# Patient Record
Sex: MALE | Race: WHITE | Employment: OTHER | URBAN - METROPOLITAN AREA
[De-identification: names, ages, dates, MRNs, and addresses within clinical notes are randomized per-mention and may not be internally consistent; named-entity substitution may affect disease eponyms.]

---

## 2017-02-04 ENCOUNTER — HOSPITAL ENCOUNTER (EMERGENCY)
Age: 22
Discharge: HOME | End: 2017-02-04
Attending: FAMILY MEDICINE | Admitting: FAMILY MEDICINE
Payer: MEDICAID

## 2017-02-04 ENCOUNTER — APPOINTMENT (OUTPATIENT)
Dept: GENERAL RADIOLOGY | Age: 22
End: 2017-02-04
Attending: PHYSICIAN ASSISTANT
Payer: MEDICAID

## 2017-02-04 ENCOUNTER — APPOINTMENT (OUTPATIENT)
Dept: CT IMAGING | Age: 22
End: 2017-02-04
Attending: PHYSICIAN ASSISTANT
Payer: MEDICAID

## 2017-02-04 VITALS
OXYGEN SATURATION: 100 % | HEIGHT: 71 IN | HEART RATE: 78 BPM | BODY MASS INDEX: 19.04 KG/M2 | TEMPERATURE: 97.4 F | WEIGHT: 136 LBS | SYSTOLIC BLOOD PRESSURE: 134 MMHG | DIASTOLIC BLOOD PRESSURE: 75 MMHG | RESPIRATION RATE: 16 BRPM

## 2017-02-04 DIAGNOSIS — S20.212A RIB CONTUSION, LEFT, INITIAL ENCOUNTER: ICD-10-CM

## 2017-02-04 DIAGNOSIS — T07.XXXA ABRASIONS OF MULTIPLE SITES: ICD-10-CM
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
## 2017-02-04 DIAGNOSIS — S50.11XA CONTUSION, ELBOW, WITH FOREARM, RIGHT, INITIAL ENCOUNTER: ICD-10-CM

## 2017-02-04 DIAGNOSIS — S02.402A CLOSED FRACTURE OF ZYGOMATIC ARCH, INITIAL ENCOUNTER (HCC): ICD-10-CM

## 2017-02-04 PROCEDURE — 70486 CT MAXILLOFACIAL W/O DYE: CPT

## 2017-02-04 PROCEDURE — 71101 X-RAY EXAM UNILAT RIBS/CHEST: CPT

## 2017-02-04 PROCEDURE — 73080 X-RAY EXAM OF ELBOW: CPT

## 2017-02-04 PROCEDURE — 99284 EMERGENCY DEPT VISIT MOD MDM: CPT

## 2017-02-04 PROCEDURE — 70450 CT HEAD/BRAIN W/O DYE: CPT

## 2017-02-04 RX ORDER — HYDROCODONE BITARTRATE AND ACETAMINOPHEN 5; 325 MG/1; MG/1
1 TABLET ORAL
Qty: 20 TAB | Refills: 0 | Status: SHIPPED | OUTPATIENT
Start: 2017-02-04 | End: 2018-09-26

## 2017-02-04 RX ORDER — DEXTROAMPHETAMINE SACCHARATE, AMPHETAMINE ASPARTATE, DEXTROAMPHETAMINE SULFATE AND AMPHETAMINE SULFATE 2.5; 2.5; 2.5; 2.5 MG/1; MG/1; MG/1; MG/1
10 TABLET ORAL
COMMUNITY

## 2017-02-04 RX ORDER — ALBUTEROL SULFATE 90 UG/1
AEROSOL, METERED RESPIRATORY (INHALATION)
COMMUNITY

## 2017-02-04 RX ORDER — IBUPROFEN 800 MG/1
800 TABLET ORAL
Qty: 20 TAB | Refills: 0 | Status: SHIPPED | OUTPATIENT
Start: 2017-02-04 | End: 2017-02-11
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
## 2017-02-04 NOTE — DISCHARGE INSTRUCTIONS
Bruises: Care Instructions  Your Care Instructions    Bruises occur when small blood vessels under the skin tear or rupture, most often from a twist, bump, or fall. Blood leaks into tissues under the skin and causes a black-and-blue spot that often turns colors, including purplish black, reddish blue, or yellowish green, as the bruise heals. Bruises hurt, but most are not serious and will go away on their own within 2 to 4 weeks. Sometimes, gravity causes them to spread down the body. A leg bruise usually will take longer to heal than a bruise on the face or arms. Follow-up care is a key part of your treatment and safety. Be sure to make and go to all appointments, and call your doctor if you are having problems. Its also a good idea to know your test results and keep a list of the medicines you take. How can you care for yourself at home? · Take pain medicines exactly as directed. ¨ If the doctor gave you a prescription medicine for pain, take it as prescribed. ¨ If you are not taking a prescription pain medicine, ask your doctor if you can take an over-the-counter medicine. · Put ice or a cold pack on the area for 10 to 20 minutes at a time. Put a thin cloth between the ice and your skin. · If you can, prop up the bruised area on pillows as much as possible for the next few days. Try to keep the bruise above the level of your heart. When should you call for help? Call your doctor now or seek immediate medical care if:  · You have signs of infection, such as:  ¨ Increased pain, swelling, warmth, or redness. ¨ Red streaks leading from the bruise. ¨ Pus draining from the bruise. ¨ A fever. · You have a bruise on your leg and signs of a blood clot, such as:  ¨ Increasing redness and swelling along with warmth, tenderness, and pain in the bruised area. ¨ Pain in your calf, back of the knee, thigh, or groin. ¨ Redness and swelling in your leg or groin. · Your pain gets worse.   Watch closely for changes in your health, and be sure to contact your doctor if:  · You do not get better as expected. Where can you learn more? Go to http://dutch-griselda.info/. Enter [phone] in the search box to learn more about \"Bruises: Care Instructions. \"  Current as of: May 27, 2016  Content Version: 11.1  © 0064-2954 [employer]. Care instructions adapted under license by [employer] (which disclaims liability or warranty for this information). If you have questions about a medical condition or this instruction, always ask your healthcare professional. [place] [address] any warranty or liability for your use of this information. Facial Fracture: Care Instructions  Your Care Instructions  You have broken (fractured) one or more bones in your face. Swelling and bruising from the injury are likely to get worse over the first couple of days. After that, the swelling should steadily improve until it is gone. If you have bruises on your face, they may change as they heal. The skin may turn from black and blue to green to yellow or brown before it returns to its normal color. It may take several weeks for your injury to heal.  It is very important that you get follow-up care as directed so that the injury heals properly and does not lead to problems. The kind of care and treatment you will need depends on the specific type of break (or breaks) you have. You heal best when you take good care of yourself. Eat a variety of healthy foods, and don't smoke. Follow-up care is a key part of your treatment and safety. Be sure to make and go to all appointments, and call your doctor if you are having problems. It's also a good idea to know your test results and keep a list of the medicines you take. How can you care for yourself at home? · Put ice or a cold pack on your injury for 10 to 20 minutes at a time.  Try to do this every 1 to 2 hours for the next 3 days (when you are awake) or until the swelling goes down. Put a thin cloth between the ice pack and your skin. · Go to all follow-up appointments with your doctor. Your doctor will determine whether you need further treatment, including surgery. · Take your medicines exactly as prescribed. Call your doctor if you think you are having a problem with your medicine. You will get more details on the specific medicines your doctor prescribes. · If your doctor prescribed antibiotics, take them exactly as directed. Do not stop taking them just because you feel better. You need to take the full course of antibiotics. · Be safe with medicines. Read and follow all instructions on the label. ¨ If the doctor gave you a prescription medicine for pain, take it as prescribed. ¨ If you are not taking a prescription pain medicine, ask your doctor if you can take an over-the-counter medicine. · Keep your head elevated when you sleep. · Eat soft food to decrease jaw pain. · Do not blow your nose. Dab it with a tissue if you need to. When should you call for help? Call 911 anytime you think you may need emergency care. For example, call if:  · You have a seizure. · You passed out (lost consciousness). · You have tingling, weakness, or numbness on one side of your body. Call your doctor now or seek immediate medical care if:  · You have a severe headache. · You develop double vision. · You have a fever and stiff neck. · Clear, watery fluid drains from your nose. · You feel dizzy or lightheaded. · You have new eye pain or changes in your vision, such as blurring. · You have new ear pain, ringing in your ears, or trouble hearing. · You are confused, irritable, or not acting normally. · You have a hard time standing, walking, or talking. · You have new mouth or tooth pain, or you have trouble chewing. · You have increasing pain even after you have taken your pain medicine.   Watch closely for changes in your health, and be sure to contact your doctor if:  · You develop a cough, cold, or sinus infection. · The symptoms from your injury are not steadily improving. Where can you learn more? Go to http://dutch-griselda.info/. Enter [id number] [id number] [phone] in the search box to learn more about \"Facial Fracture: Care Instructions. \"  Current as of: February 19, 2016  Content Version: 11.1  © 0206-9852 [employer]. Care instructions adapted under license by [employer] (which disclaims liability or warranty for this information). If you have questions about a medical condition or this instruction, always ask your healthcare professional. [place] [address] any warranty or liability for your use of this information. Scrapes (Abrasions): Care Instructions  Your Care Instructions  Scrapes (abrasions) are wounds where your skin has been rubbed or torn off. Most scrapes do not go deep into the skin, but some may remove several layers of skin. Scrapes usually don't bleed much, but they may ooze pinkish fluid. Scrapes on the head or face may appear worse than they are. They may bleed a lot because of the good blood supply to this area. Most scrapes heal well and may not need a bandage. They usually heal within 3 to 7 days. A large, deep scrape may take 1 to 2 weeks or longer to heal. A scab may form on some scrapes. Follow-up care is a key part of your treatment and safety. Be sure to make and go to all appointments, and call your doctor if you are having problems. It's also a good idea to know your test results and keep a list of the medicines you take. How can you care for yourself at home? · If your doctor told you how to care for your wound, follow your doctor's instructions. If you did not get instructions, follow this general advice:  ¨ Wash the scrape with clean water 2 times a day. Don't use hydrogen peroxide or alcohol, which can slow healing.   ¨ You may cover the scrape with a thin layer of petroleum jelly, such as Vaseline, and a nonstick bandage. ¨ Apply more petroleum jelly and replace the bandage as needed. · Prop up the injured area on a pillow anytime you sit or lie down during the next 3 days. Try to keep it above the level of your heart. This will help reduce swelling. · Be safe with medicines. Take pain medicines exactly as directed. ¨ If the doctor gave you a prescription medicine for pain, take it as prescribed. ¨ If you are not taking a prescription pain medicine, ask your doctor if you can take an over-the-counter medicine. When should you call for help? Call your doctor now or seek immediate medical care if:  · You have signs of infection, such as:  ¨ Increased pain, swelling, warmth, or redness around the scrape. ¨ Red streaks leading from the scrape. ¨ Pus draining from the scrape. ¨ A fever. · The scrape starts to bleed, and blood soaks through the bandage. Oozing small amounts of blood is normal.  Watch closely for changes in your health, and be sure to contact your doctor if the scrape is not getting better each day. Where can you learn more? Go to http://dutch-griselda.info/. Enter A374 in the search box to learn more about \"Scrapes (Abrasions): Care Instructions. \"  Current as of: May 27, 2016  Content Version: 11.1  © 9694-3486 [employer]. Care instructions adapted under license by [employer] (which disclaims liability or warranty for this information). If you have questions about a medical condition or this instruction, always ask your healthcare professional. [place] [address] any warranty or liability for your use of this information.

## 2018-08-08 ENCOUNTER — HOSPITAL ENCOUNTER (EMERGENCY)
Age: 23
Discharge: OTHER INSTITUTION | End: 2018-08-08
Attending: EMERGENCY MEDICINE
Payer: MEDICAID

## 2018-08-08 ENCOUNTER — APPOINTMENT (OUTPATIENT)
Dept: GENERAL RADIOLOGY | Age: 23
End: 2018-08-08
Attending: EMERGENCY MEDICINE
Payer: MEDICAID

## 2018-08-08 VITALS
BODY MASS INDEX: 18.9 KG/M2 | RESPIRATION RATE: 22 BRPM | TEMPERATURE: 98.7 F | WEIGHT: 135 LBS | DIASTOLIC BLOOD PRESSURE: 73 MMHG | SYSTOLIC BLOOD PRESSURE: 109 MMHG | OXYGEN SATURATION: 100 % | HEART RATE: 119 BPM | HEIGHT: 71 IN

## 2018-08-08 DIAGNOSIS — S81.841A: Primary | ICD-10-CM

## 2018-08-08 PROCEDURE — 96375 TX/PRO/DX INJ NEW DRUG ADDON: CPT

## 2018-08-08 PROCEDURE — 99284 EMERGENCY DEPT VISIT MOD MDM: CPT

## 2018-08-08 PROCEDURE — 96374 THER/PROPH/DIAG INJ IV PUSH: CPT

## 2018-08-08 PROCEDURE — 73590 X-RAY EXAM OF LOWER LEG: CPT

## 2018-08-08 PROCEDURE — 74011250636 HC RX REV CODE- 250/636: Performed by: EMERGENCY MEDICINE

## 2018-08-08 PROCEDURE — 75810000053 HC SPLINT APPLICATION

## 2018-08-08 PROCEDURE — 96376 TX/PRO/DX INJ SAME DRUG ADON: CPT

## 2018-08-08 RX ORDER — MONTELUKAST SODIUM 10 MG/1
10 TABLET ORAL DAILY
COMMUNITY

## 2018-08-08 RX ORDER — FLUTICASONE PROPIONATE AND SALMETEROL 100; 50 UG/1; UG/1
1 POWDER RESPIRATORY (INHALATION) EVERY 12 HOURS
COMMUNITY

## 2018-08-08 RX ORDER — ONDANSETRON 2 MG/ML
4 INJECTION INTRAMUSCULAR; INTRAVENOUS
Status: COMPLETED | OUTPATIENT
Start: 2018-08-08 | End: 2018-08-08

## 2018-08-08 RX ORDER — LORAZEPAM 2 MG/ML
1 INJECTION INTRAMUSCULAR ONCE
Status: COMPLETED | OUTPATIENT
Start: 2018-08-08 | End: 2018-08-08

## 2018-08-08 RX ORDER — FENTANYL CITRATE 50 UG/ML
50 INJECTION, SOLUTION INTRAMUSCULAR; INTRAVENOUS
Status: COMPLETED | OUTPATIENT
Start: 2018-08-08 | End: 2018-08-08

## 2018-08-08 RX ADMIN — FENTANYL CITRATE 50 MCG: 50 INJECTION, SOLUTION INTRAMUSCULAR; INTRAVENOUS at 19:39

## 2018-08-08 RX ADMIN — ONDANSETRON 4 MG: 2 INJECTION, SOLUTION INTRAMUSCULAR; INTRAVENOUS at 19:22

## 2018-08-08 RX ADMIN — ONDANSETRON 4 MG: 2 INJECTION, SOLUTION INTRAMUSCULAR; INTRAVENOUS at 20:01

## 2018-08-08 RX ADMIN — FENTANYL CITRATE 50 MCG: 50 INJECTION, SOLUTION INTRAMUSCULAR; INTRAVENOUS at 19:21

## 2018-08-08 RX ADMIN — LORAZEPAM 1 MG: 2 INJECTION INTRAMUSCULAR; INTRAVENOUS at 19:38

## 2018-08-08 NOTE — ED PROVIDER NOTES
EMERGENCY DEPARTMENT HISTORY AND PHYSICAL EXAM    Date: 8/8/2018  Patient Name: Donna Miles    History of Presenting Illness     Chief Complaint   Patient presents with    Leg Injury         History Provided By: Patient    Chief Complaint: Crossbow arrow in right calf  Duration: PTA   Timing:  Acute  Location: Right calf  Severity: 10 out of 10  Modifying Factors: None  Associated Symptoms: right calf pain and right foot numbness    Additional History (Context):   7:04 PM  Manoj Waldrop is a 23 y.o. male with PMHx of anxiety who presents to the emergency department C/O crossbow arrow in right calf, onset PTA. Associated sxs include right calf pain and right foot numbness. Pt reports that he was calibrating a crossbow when the arrow was accidentally released and pierced his right calf. Arrow is currently midway into pts right calf. Pt denies fever, chills, LOC, or any other sxs or complaints. PCP: Sánchez Mullen MD    Current Outpatient Prescriptions   Medication Sig Dispense Refill    fluticasone-salmeterol (ADVAIR DISKUS) 100-50 mcg/dose diskus inhaler Take 1 Puff by inhalation every twelve (12) hours.  montelukast (SINGULAIR) 10 mg tablet Take 10 mg by mouth daily.  dextroamphetamine-amphetamine (ADDERALL) 10 mg tablet Take 10 mg by mouth.  albuterol (VENTOLIN HFA) 90 mcg/actuation inhaler Take  by inhalation.  HYDROcodone-acetaminophen (NORCO) 5-325 mg per tablet Take 1 Tab by mouth every four (4) hours as needed for Pain. Max Daily Amount: 6 Tabs. 20 Tab 0       Past History     Past Medical History:  Past Medical History:   Diagnosis Date    ADHD     Anxiety     Anxiety     Asthma     Scoliosis        Past Surgical History:  History reviewed. No pertinent surgical history. Family History:  History reviewed. No pertinent family history.     Social History:  Social History   Substance Use Topics    Smoking status: Light Tobacco Smoker     Packs/day: 0.50 Types: Cigarettes    Smokeless tobacco: Never Used      Comment: 6 daily    Alcohol use Yes      Comment: socially       Allergies: Allergies   Allergen Reactions    Abilify [Aripiprazole] Other (comments)     Seizure           Review of Systems   Review of Systems   Constitutional: Negative for chills and fever. HENT: Negative for ear pain and hearing loss. Eyes: Negative for pain and visual disturbance. Respiratory: Negative for shortness of breath. Cardiovascular: Negative for chest pain. Gastrointestinal: Negative for abdominal pain. Genitourinary: Negative for difficulty urinating and dysuria. Musculoskeletal: Positive for myalgias (right calf). Skin:        (+) crossbow arrow in right calf   Neurological: Positive for numbness (right foot). Negative for dizziness, syncope, light-headedness and headaches. All other systems reviewed and are negative. Physical Exam     Vitals:    08/08/18 1911 08/08/18 1944 08/08/18 1947   BP: 117/66  109/73   Pulse: (!) 151  (!) 119   Resp: 22  22   Temp: 98 °F (36.7 °C)  98.7 °F (37.1 °C)   SpO2: 96% 100% 100%   Weight: 61.2 kg (135 lb)     Height: 5' 11\" (1.803 m)       Physical Exam   Constitutional: He is oriented to person, place, and time. He appears well-developed. HENT:   Head: Normocephalic and atraumatic. Eyes: Pupils are equal, round, and reactive to light. Neck: Normal range of motion. Cardiovascular: Normal rate and regular rhythm. Pulses:       Dorsalis pedis pulses are 2+ on the right side, and 2+ on the left side. DP pulses are strong   Pulmonary/Chest: Effort normal and breath sounds normal. No respiratory distress. Abdominal: Soft. He exhibits no distension. There is no tenderness. Genitourinary: Penis normal.   Musculoskeletal: Normal range of motion. Right lower leg: He exhibits tenderness. RLE: Can wiggle toes, foot is warm, calf is tender but not hard.  There is an arrow in the right calf Neurological: He is alert and oriented to person, place, and time. Psychiatric: His mood appears anxious. Nursing note and vitals reviewed. Diagnostic Study Results     Labs -   No results found for this or any previous visit (from the past 12 hour(s)). Radiologic Studies -    XR TIB/FIB RT    (Results Pending)     7:26 PM  RADIOLOGY FINDINGS  Right tib/fib X-ray shows a foreign body through the soft tissue no signs of fx  Pending review by Radiologist  Recorded by Judah Jackson, ED Scribe, as dictated by Broderick Parnell MD    CT Results  (Last 48 hours)    None        CXR Results  (Last 48 hours)    None            Medical Decision Making   I am the first provider for this patient. I reviewed the vital signs, available nursing notes, past medical history, past surgical history, family history and social history. Vital Signs-Reviewed the patient's vital signs. Pulse Oximetry Analysis - 96% on RA     Records Reviewed: Nursing Notes    Provider Notes (Medical Decision Making):   23 y.o male presenting with crossbow arrow in the right calf. Pulses are palpable. No signs yet of compartment syndrome. Trauma was consulted at [facility] due to foreign body in pt's calf and need for surgical removal.    Procedures:  Procedures    MEDICATIONS GIVEN:  Medications   fentaNYL citrate (PF) injection 50 mcg (50 mcg IntraVENous Given 8/8/18 1921)   ondansetron (ZOFRAN) injection 4 mg (4 mg IntraVENous Given 8/8/18 1922)   fentaNYL citrate (PF) injection 50 mcg (50 mcg IntraVENous Given 8/8/18 1939)   LORazepam (ATIVAN) injection 1 mg (1 mg IntraVENous Given 8/8/18 1938)       ED Course:   7:04 PM   Initial assessment performed. The patients presenting problems have been discussed, and they are in agreement with the care plan formulated and outlined with them. I have encouraged them to ask questions as they arise throughout their visit.      7:27 PM Discussed patient's history, exam, and available diagnostics results with Leonora Vail. Jung Beaver MD, Emergency Medicine [facility]), who agree with transfer to [facility]. Diagnosis and Disposition       TRANSFER PROGRESS NOTE:    7:33 PM  Discussed impending transfer with Patient and/or family. Pt and/or family instructed that Pt would be transferred to [facility]. Discussed reasoning for transfer and future treatment plan. Family and Pt understands and agrees with care plan. Written by Deangelo Tubbs, ED Scribe, as dictated by Stephanie Dawkins MD.    Labs Reviewed - No data to display    No results found for this or any previous visit (from the past 12 hour(s)). CLINICAL IMPRESSION:    1. Penetrating foreign body of skin of right lower leg, initial encounter        PLAN:  1. Transfer to [facility]  _______________________________    Attestations: This note is prepared by Deangelo Tubbs, acting as Scribe for Stephanie Dawkins MD.    Stephanie Dawkins MD:  The scribe's documentation has been prepared under my direction and personally reviewed by me in its entirety.   I confirm that the note above accurately reflects all work, treatment, procedures, and medical decision making performed by me.  _______________________________

## 2018-08-08 NOTE — ED NOTES
Pt in significant pain s/p arrow wound to right leg. Medicated x 2 with 50 mcg fentanyl and x 1 with 1 mg ativan, all iv. Immobilization splint to right leg in place, distal pulses intact.

## 2018-08-08 NOTE — ED TRIAGE NOTES
Patient to ED from home follow ing a cross bow incident. Patient states he was calibrating his cross bow when it fired and ricocheted into his right calf. Patient has palpable DP PT pulses. States he cannot feel his foot but feels his toes. Patient is able to wiggle toes on command.

## 2018-08-09 NOTE — ROUTINE PROCESS
TRANSFER - OUT REPORT:    Verbal report given to 2020 Rock Miller RN(name) on Romina Mora  being transferred to [facility] ER for urgent transfer       Report consisted of patients Situation, Background, Assessment and   Recommendations(SBAR). Information from the following report(s) ED Summary and MAR was reviewed with the receiving nurse. Lines:   Peripheral IV 08/08/18 Right;Upper Forearm (Active)   Site Assessment Clean, dry, & intact 8/8/2018  7:09 PM   Phlebitis Assessment 0 8/8/2018  7:09 PM   Infiltration Assessment 0 8/8/2018  7:09 PM   Dressing Status Clean, dry, & intact 8/8/2018  7:09 PM   Hub Color/Line Status Green;Capped;Flushed;Patent 8/8/2018  7:09 PM   Action Taken Blood drawn 8/8/2018  7:09 PM   Alcohol Cap Used Yes 8/8/2018  7:09 PM        Opportunity for questions and clarification was provided.       Patient transported by [facility]

## 2018-08-09 NOTE — ED NOTES
Patient transported via [address], alert and oriented, respirations are even and unlabored NAD.

## 2018-08-09 NOTE — ED NOTES
LDA removed in [facility] for documentation purposes only. Patient admitted to hospital with; site 1- Peripheral IV, which at time of admission is Clean, Dry, and intact, no signs or symptoms of phlebitis. No signs or symptoms of infiltration.

## 2018-09-19 ENCOUNTER — CLINICAL SUPPORT (OUTPATIENT)
Dept: FAMILY MEDICINE CLINIC | Age: 23
End: 2018-09-19

## 2018-09-26 ENCOUNTER — APPOINTMENT (OUTPATIENT)
Dept: CT IMAGING | Age: 23
End: 2018-09-26
Attending: EMERGENCY MEDICINE
Payer: MEDICAID

## 2018-09-26 ENCOUNTER — HOSPITAL ENCOUNTER (EMERGENCY)
Age: 23
Discharge: HOME | End: 2018-09-26
Attending: EMERGENCY MEDICINE
Payer: MEDICAID

## 2018-09-26 VITALS
WEIGHT: 135 LBS | BODY MASS INDEX: 18.9 KG/M2 | HEIGHT: 71 IN | HEART RATE: 81 BPM | TEMPERATURE: 98.4 F | DIASTOLIC BLOOD PRESSURE: 81 MMHG | OXYGEN SATURATION: 100 % | SYSTOLIC BLOOD PRESSURE: 139 MMHG | RESPIRATION RATE: 16 BRPM

## 2018-09-26 DIAGNOSIS — S13.9XXA CERVICAL SPRAIN, INITIAL ENCOUNTER: Primary | ICD-10-CM
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
## 2018-09-26 DIAGNOSIS — S30.0XXA LUMBAR CONTUSION, INITIAL ENCOUNTER: ICD-10-CM

## 2018-09-26 LAB
ALBUMIN SERPL-MCNC: 4.1 G/DL (ref 3.4–5)
ALBUMIN/GLOB SERPL: 1.3 {RATIO} (ref 0.8–1.7)
ALP SERPL-CCNC: 49 U/L (ref 45–117)
ALT SERPL-CCNC: 18 U/L (ref 16–61)
AMORPH CRY URNS QL MICRO: ABNORMAL
ANION GAP SERPL CALC-SCNC: 9 MMOL/L (ref 3–18)
APPEARANCE UR: CLEAR
AST SERPL-CCNC: 14 U/L (ref 15–37)
BACTERIA URNS QL MICRO: NEGATIVE /HPF
BASOPHILS # BLD: 0 K/UL (ref 0–0.1)
BASOPHILS NFR BLD: 0 % (ref 0–2)
BILIRUB SERPL-MCNC: 0.4 MG/DL (ref 0.2–1)
BILIRUB UR QL: NEGATIVE
BUN SERPL-MCNC: 12 MG/DL (ref 7–18)
BUN/CREAT SERPL: 12 (ref 12–20)
CALCIUM SERPL-MCNC: 9.2 MG/DL (ref 8.5–10.1)
CHLORIDE SERPL-SCNC: 105 MMOL/L (ref 100–108)
CO2 SERPL-SCNC: 28 MMOL/L (ref 21–32)
COLOR UR: YELLOW
CREAT SERPL-MCNC: 0.97 MG/DL (ref 0.6–1.3)
DIFFERENTIAL METHOD BLD: ABNORMAL
EOSINOPHIL # BLD: 0.1 K/UL (ref 0–0.4)
EOSINOPHIL NFR BLD: 1 % (ref 0–5)
EPITH CASTS URNS QL MICRO: ABNORMAL /LPF (ref 0–5)
ERYTHROCYTE [DISTWIDTH] IN BLOOD BY AUTOMATED COUNT: 12.3 % (ref 11.6–14.5)
GLOBULIN SER CALC-MCNC: 3.2 G/DL (ref 2–4)
GLUCOSE SERPL-MCNC: 112 MG/DL (ref 74–99)
GLUCOSE UR STRIP.AUTO-MCNC: NEGATIVE MG/DL
HCT VFR BLD AUTO: 41.7 % (ref 36–48)
HGB BLD-MCNC: 14.4 G/DL (ref 13–16)
HGB UR QL STRIP: NEGATIVE
KETONES UR QL STRIP.AUTO: NEGATIVE MG/DL
LEUKOCYTE ESTERASE UR QL STRIP.AUTO: ABNORMAL
LIPASE SERPL-CCNC: 117 U/L (ref 73–393)
LYMPHOCYTES # BLD: 1.3 K/UL (ref 0.9–3.6)
LYMPHOCYTES NFR BLD: 21 % (ref 21–52)
MAGNESIUM SERPL-MCNC: 1.8 MG/DL (ref 1.6–2.6)
MCH RBC QN AUTO: 32.5 PG (ref 24–34)
MCHC RBC AUTO-ENTMCNC: 34.5 G/DL (ref 31–37)
MCV RBC AUTO: 94.1 FL (ref 74–97)
MONOCYTES # BLD: 0.5 K/UL (ref 0.05–1.2)
MONOCYTES NFR BLD: 8 % (ref 3–10)
NEUTS SEG # BLD: 4.4 K/UL (ref 1.8–8)
NEUTS SEG NFR BLD: 70 % (ref 40–73)
NITRITE UR QL STRIP.AUTO: NEGATIVE
PH UR STRIP: 7.5 [PH] (ref 5–8)
PLATELET # BLD AUTO: 247 K/UL (ref 135–420)
PMV BLD AUTO: 10.1 FL (ref 9.2–11.8)
POTASSIUM SERPL-SCNC: 3.5 MMOL/L (ref 3.5–5.5)
PROT SERPL-MCNC: 7.3 G/DL (ref 6.4–8.2)
PROT UR STRIP-MCNC: NEGATIVE MG/DL
RBC # BLD AUTO: 4.43 M/UL (ref 4.7–5.5)
RBC #/AREA URNS HPF: NEGATIVE /HPF (ref 0–5)
SODIUM SERPL-SCNC: 142 MMOL/L (ref 136–145)
SP GR UR REFRACTOMETRY: 1.01 (ref 1–1.03)
UROBILINOGEN UR QL STRIP.AUTO: 0.2 EU/DL (ref 0.2–1)
WBC # BLD AUTO: 6.3 K/UL (ref 4.6–13.2)
WBC URNS QL MICRO: ABNORMAL /HPF (ref 0–5)

## 2018-09-26 PROCEDURE — 85025 COMPLETE CBC W/AUTO DIFF WBC: CPT | Performed by: EMERGENCY MEDICINE

## 2018-09-26 PROCEDURE — 81001 URINALYSIS AUTO W/SCOPE: CPT | Performed by: EMERGENCY MEDICINE

## 2018-09-26 PROCEDURE — 80053 COMPREHEN METABOLIC PANEL: CPT | Performed by: EMERGENCY MEDICINE

## 2018-09-26 PROCEDURE — 96376 TX/PRO/DX INJ SAME DRUG ADON: CPT

## 2018-09-26 PROCEDURE — 70450 CT HEAD/BRAIN W/O DYE: CPT

## 2018-09-26 PROCEDURE — 83690 ASSAY OF LIPASE: CPT | Performed by: EMERGENCY MEDICINE

## 2018-09-26 PROCEDURE — 83735 ASSAY OF MAGNESIUM: CPT | Performed by: EMERGENCY MEDICINE

## 2018-09-26 PROCEDURE — 71260 CT THORAX DX C+: CPT

## 2018-09-26 PROCEDURE — 96375 TX/PRO/DX INJ NEW DRUG ADDON: CPT

## 2018-09-26 PROCEDURE — 74011636320 HC RX REV CODE- 636/320: Performed by: EMERGENCY MEDICINE

## 2018-09-26 PROCEDURE — 96374 THER/PROPH/DIAG INJ IV PUSH: CPT

## 2018-09-26 PROCEDURE — 74011250636 HC RX REV CODE- 250/636: Performed by: EMERGENCY MEDICINE

## 2018-09-26 PROCEDURE — 72125 CT NECK SPINE W/O DYE: CPT

## 2018-09-26 PROCEDURE — 99284 EMERGENCY DEPT VISIT MOD MDM: CPT

## 2018-09-26 RX ORDER — ONDANSETRON 4 MG/1
4 TABLET, FILM COATED ORAL
Qty: 12 TAB | Refills: 0 | Status: SHIPPED | OUTPATIENT
Start: 2018-09-26 | End: 2018-12-31

## 2018-09-26 RX ORDER — ONDANSETRON 2 MG/ML
4 INJECTION INTRAMUSCULAR; INTRAVENOUS
Status: COMPLETED | OUTPATIENT
Start: 2018-09-26 | End: 2018-09-26

## 2018-09-26 RX ORDER — MORPHINE SULFATE 4 MG/ML
4 INJECTION INTRAVENOUS
Status: COMPLETED | OUTPATIENT
Start: 2018-09-26 | End: 2018-09-26

## 2018-09-26 RX ORDER — IBUPROFEN 800 MG/1
800 TABLET ORAL
Qty: 20 TAB | Refills: 0 | Status: SHIPPED | OUTPATIENT
Start: 2018-09-26 | End: 2018-10-03

## 2018-09-26 RX ORDER — SODIUM CHLORIDE 0.9 % (FLUSH) 0.9 %
5-10 SYRINGE (ML) INJECTION AS NEEDED
Status: DISCONTINUED | OUTPATIENT
Start: 2018-09-26 | End: 2018-09-27 | Stop reason: HOSPADM

## 2018-09-26 RX ORDER — ACETAMINOPHEN AND CODEINE PHOSPHATE 300; 30 MG/1; MG/1
1 TABLET ORAL
Qty: 6 TAB | Refills: 0 | Status: SHIPPED | OUTPATIENT
Start: 2018-09-26 | End: 2018-09-26

## 2018-09-26 RX ORDER — SODIUM CHLORIDE 0.9 % (FLUSH) 0.9 %
5-10 SYRINGE (ML) INJECTION EVERY 8 HOURS
Status: DISCONTINUED | OUTPATIENT
Start: 2018-09-26 | End: 2018-09-27 | Stop reason: HOSPADM

## 2018-09-26 RX ORDER — ACETAMINOPHEN AND CODEINE PHOSPHATE 300; 30 MG/1; MG/1
1 TABLET ORAL
Qty: 6 TAB | Refills: 0 | Status: SHIPPED | OUTPATIENT
Start: 2018-09-26 | End: 2018-12-31

## 2018-09-26 RX ORDER — METHOCARBAMOL 500 MG/1
1000 TABLET, FILM COATED ORAL 3 TIMES DAILY
Qty: 30 TAB | Refills: 0 | Status: SHIPPED | OUTPATIENT
Start: 2018-09-26 | End: 2018-12-31

## 2018-09-26 RX ADMIN — MORPHINE SULFATE 4 MG: 4 INJECTION INTRAVENOUS at 21:55

## 2018-09-26 RX ADMIN — ONDANSETRON 4 MG: 2 INJECTION INTRAMUSCULAR; INTRAVENOUS at 20:11

## 2018-09-26 RX ADMIN — Medication 10 ML: at 20:11

## 2018-09-26 RX ADMIN — IOPAMIDOL 100 ML: 612 INJECTION, SOLUTION INTRAVENOUS at 21:00

## 2018-09-26 RX ADMIN — Medication 10 ML: at 21:55

## 2018-09-26 RX ADMIN — MORPHINE SULFATE 4 MG: 4 INJECTION INTRAVENOUS at 20:10

## 2018-09-26 NOTE — ED PROVIDER NOTES
EMERGENCY DEPARTMENT HISTORY AND PHYSICAL EXAM 
 
Date: 9/26/2018 Patient Name: Lila Kebede History of Presenting Illness Chief Complaint Patient presents with  Fall  Back Pain  Loss of Consciousness History Provided By: Patient Chief Complaint: Low back pain s/p fall Duration: 1 hour ago Timing:  Acute Location: Low back Severity: 7 out of 10 Associated Symptoms: SOB, LOC, and mild RLE numbness Additional History (Context):  
7:25 PM 
Inna Trevino is a 20 y.o. male with PMHx of asthma, anxiety, scoliosis, and Crohn's disease who presents to the emergency department C/O low back pain (rated 7/10), onset 1 hour ago s/p fall from 8 foot ladder. Associated sxs include SOB, LOC, and mild RLE numbness. Pt states that he landed on his back but is unsure of head or neck injury. Pt was told by father that he lost consciousness for 15 seconds. Of note, pt is left hand dominant. Notes FHx of asthma. Endorses social EtOH and tobacco use (0.5 ppd) use. Has allergy to Abilify. Pt denies pelvic/genital numbness, abdominal surgery, illicit drug use, recent steroid use, or any other sxs or complaints. PCP: Not On File [facility] 
 
Current Facility-Administered Medications Medication Dose Route Frequency Provider Last Rate Last Dose  sodium chloride (NS) flush 5-10 mL  5-10 mL IntraVENous Q8H Terry Bundy MD      
 sodium chloride (NS) flush 5-10 mL  5-10 mL IntraVENous PRN Terry Bundy MD   10 mL at 09/26/18 2097 Current Outpatient Prescriptions Medication Sig Dispense Refill  ibuprofen (MOTRIN) 800 mg tablet Take 1 Tab by mouth every six (6) hours as needed for Pain for up to 7 days. 20 Tab 0  
 methocarbamol (ROBAXIN) 500 mg tablet Take 2 Tabs by mouth three (3) times daily. 30 Tab 0  
 ondansetron hcl (ZOFRAN) 4 mg tablet Take 1 Tab by mouth every eight (8) hours as needed for Nausea.  12 Tab 0  
  acetaminophen-codeine (TYLENOL-CODEINE #3) 300-30 mg per tablet Take 1 Tab by mouth every four (4) hours as needed for Pain. Max Daily Amount: 6 Tabs. 6 Tab 0  
 fluticasone-salmeterol (ADVAIR DISKUS) 100-50 mcg/dose diskus inhaler Take 1 Puff by inhalation every twelve (12) hours.  montelukast (SINGULAIR) 10 mg tablet Take 10 mg by mouth daily.  dextroamphetamine-amphetamine (ADDERALL) 10 mg tablet Take 10 mg by mouth.  albuterol (VENTOLIN HFA) 90 mcg/actuation inhaler Take  by inhalation. Past History Past Medical History: 
Past Medical History:  
Diagnosis Date  ADHD  Anxiety  Anxiety  Asthma  Scoliosis Past Surgical History: 
History reviewed. No pertinent surgical history. Family History: 
History reviewed. No pertinent family history. Social History: 
Social History Substance Use Topics  Smoking status: Light Tobacco Smoker Packs/day: 0.50 Types: Cigarettes  Smokeless tobacco: Never Used Comment: 6 daily  Alcohol use Yes Comment: socially Allergies: Allergies Allergen Reactions  Abilify [Aripiprazole] Other (comments) Seizure Review of Systems Review of Systems Constitutional: Negative for chills, diaphoresis, fever and unexpected weight change. HENT: Negative for congestion, drooling, ear pain, rhinorrhea, sore throat, tinnitus and trouble swallowing. Eyes: Negative for photophobia, pain, redness and visual disturbance. Respiratory: Positive for shortness of breath. Negative for cough, choking, chest tightness, wheezing and stridor. Cardiovascular: Negative for chest pain, palpitations and leg swelling. Gastrointestinal: Negative for abdominal distention, abdominal pain, anal bleeding, blood in stool, constipation, diarrhea, nausea and vomiting. Genitourinary: Negative for difficulty urinating, dysuria, flank pain, frequency, hematuria and urgency. Musculoskeletal: Positive for back pain (low). Negative for arthralgias and neck pain. Skin: Negative for color change, rash and wound. Neurological: Positive for syncope and numbness (RLE numbness). Negative for dizziness, seizures, speech difficulty, light-headedness and headaches. Hematological: Does not bruise/bleed easily. Psychiatric/Behavioral: Negative for agitation, behavioral problems, hallucinations, self-injury and suicidal ideas. The patient is not hyperactive. Physical Exam  
 
Vitals:  
 09/26/18 1904 09/26/18 2118 09/26/18 2259 09/26/18 2300 BP: 143/81 (!) 161/96  139/81 Pulse: (!) 134 93 71 81 Resp: 16 12 16 16 Temp: 99.1 °F (37.3 °C) 98.4 °F (36.9 °C) SpO2: 100% 100% 100% 100% Weight: 61.2 kg (135 lb) Height: 5' 11\" (1.803 m) Physical Exam  
Constitutional: He is oriented to person, place, and time. He appears well-developed and well-nourished. No distress. HENT:  
Head: Normocephalic and atraumatic. Right Ear: External ear normal.  
Left Ear: External ear normal.  
Mouth/Throat: Oropharynx is clear and moist. No oropharyngeal exudate. Eyes: Conjunctivae and EOM are normal. Pupils are equal, round, and reactive to light. No scleral icterus. No pallor Neck: Normal range of motion. Neck supple. No JVD present. Muscular tenderness present. No tracheal deviation present. No thyromegaly present. Cardiovascular: Normal rate, regular rhythm and normal heart sounds. Pulmonary/Chest: Effort normal and breath sounds normal. No stridor. No respiratory distress. Abdominal: Soft. Bowel sounds are normal. He exhibits no distension. There is no tenderness. There is no rebound and no guarding. Musculoskeletal: Normal range of motion. He exhibits no edema. Lumbar back: He exhibits tenderness. Exquisitely tender to entire right flank, C-spine, and L-spine Lymphadenopathy:  
  He has no cervical adenopathy. Neurological: He is alert and oriented to person, place, and time. He has normal reflexes. No cranial nerve deficit. Coordination normal.  
Skin: Skin is warm and dry. No rash noted. He is not diaphoretic. No erythema. Psychiatric: He has a normal mood and affect. His behavior is normal. Judgment and thought content normal.  
Nursing note and vitals reviewed. Diagnostic Study Results Labs - Recent Results (from the past 12 hour(s)) CBC WITH AUTOMATED DIFF Collection Time: 09/26/18  8:00 PM  
Result Value Ref Range WBC 6.3 4.6 - 13.2 K/uL  
 RBC 4.43 (L) 4.70 - 5.50 M/uL  
 HGB 14.4 13.0 - 16.0 g/dL HCT 41.7 36.0 - 48.0 % MCV 94.1 74.0 - 97.0 FL  
 MCH 32.5 24.0 - 34.0 PG  
 MCHC 34.5 31.0 - 37.0 g/dL  
 RDW 12.3 11.6 - 14.5 % PLATELET 863 409 - 845 K/uL MPV 10.1 9.2 - 11.8 FL  
 NEUTROPHILS 70 40 - 73 % LYMPHOCYTES 21 21 - 52 % MONOCYTES 8 3 - 10 % EOSINOPHILS 1 0 - 5 % BASOPHILS 0 0 - 2 %  
 ABS. NEUTROPHILS 4.4 1.8 - 8.0 K/UL  
 ABS. LYMPHOCYTES 1.3 0.9 - 3.6 K/UL  
 ABS. MONOCYTES 0.5 0.05 - 1.2 K/UL  
 ABS. EOSINOPHILS 0.1 0.0 - 0.4 K/UL  
 ABS. BASOPHILS 0.0 0.0 - 0.1 K/UL  
 DF AUTOMATED METABOLIC PANEL, COMPREHENSIVE Collection Time: 09/26/18  8:00 PM  
Result Value Ref Range Sodium 142 136 - 145 mmol/L Potassium 3.5 3.5 - 5.5 mmol/L Chloride 105 100 - 108 mmol/L  
 CO2 28 21 - 32 mmol/L Anion gap 9 3.0 - 18 mmol/L Glucose 112 (H) 74 - 99 mg/dL BUN 12 7.0 - 18 MG/DL Creatinine 0.97 0.6 - 1.3 MG/DL  
 BUN/Creatinine ratio 12 12 - 20 GFR est AA >60 >60 ml/min/1.73m2 GFR est non-AA >60 >60 ml/min/1.73m2 Calcium 9.2 8.5 - 10.1 MG/DL Bilirubin, total 0.4 0.2 - 1.0 MG/DL  
 ALT (SGPT) 18 16 - 61 U/L  
 AST (SGOT) 14 (L) 15 - 37 U/L Alk. phosphatase 49 45 - 117 U/L Protein, total 7.3 6.4 - 8.2 g/dL Albumin 4.1 3.4 - 5.0 g/dL Globulin 3.2 2.0 - 4.0 g/dL A-G Ratio 1.3 0.8 - 1.7 LIPASE Collection Time: 09/26/18  8:00 PM  
Result Value Ref Range Lipase 117 73 - 393 U/L MAGNESIUM Collection Time: 09/26/18  8:00 PM  
Result Value Ref Range Magnesium 1.8 1.6 - 2.6 mg/dL URINALYSIS W/ RFLX MICROSCOPIC Collection Time: 09/26/18  9:08 PM  
Result Value Ref Range Color YELLOW Appearance CLEAR Specific gravity 1.006 1.005 - 1.030    
 pH (UA) 7.5 5.0 - 8.0 Protein NEGATIVE  NEG mg/dL Glucose NEGATIVE  NEG mg/dL Ketone NEGATIVE  NEG mg/dL Bilirubin NEGATIVE  NEG Blood NEGATIVE  NEG Urobilinogen 0.2 0.2 - 1.0 EU/dL Nitrites NEGATIVE  NEG Leukocyte Esterase SMALL (A) NEG URINE MICROSCOPIC ONLY Collection Time: 09/26/18  9:08 PM  
Result Value Ref Range WBC 3 to 5 0 - 5 /hpf  
 RBC NEGATIVE  0 - 5 /hpf Epithelial cells FEW 0 - 5 /lpf Bacteria NEGATIVE  NEG /hpf Amorphous Crystals 1+ (A) NEG Radiologic Studies -   
 
CT Results  (Last 48 hours) 09/26/18 2116  CT CHEST ABD PELV W CONT Final result Impression:  IMPRESSION:  
   
Chest: No posttraumatic changes seen Abdomen and pelvis: No solid or viscus organ injury. No free fluid or free air. Narrative:  EXAM: CT of the chest, abdomen, and pelvis INDICATION: Patient fell chest trauma blunt, evaluate for aortic injury. COMPARISON: None. TECHNIQUE: Axial CT imaging of the chest, abdomen, and pelvis was performed with  
intravenous contrast. Multiplanar reformats were generated. One or more dose  
reduction techniques were used on this CT: automated exposure control,  
adjustment of the Ams and/or kVp according to patient size, and iterative  
reconstruction techniques. The specific techniques used on this CT exam have  
been documented in the patient's electronic medical record.  
   
_______________ FINDINGS:  
   
CHEST:  
   
THYROID: Unremarkable LYMPH NODES: No enlarged lymph nodes PLEURA: There are no pleural effusions HEART: Normal without pericardial effusion VASCULATURE/MEDIASTINUM: There is triangular soft tissue density in the  
retrosternum suggesting residual thymic tissue. Otherwise the mediastinum is  
unremarkable. No definite aortic dissection or transection seen. LUNGS: No suspicious nodule or mass. No abnormal opacities or pneumothorax. AIRWAY: Normal.  
   
BONES: No fracture seen  
   
   
   
===============  
   
ABDOMEN/PELVIS:  
   
LIVER, BILIARY: Liver is normal. No biliary dilation. Gallbladder is  
unremarkable. PANCREAS: Normal.  
   
SPLEEN: Normal.  
   
ADRENALS: Normal.  
   
KIDNEYS: Normal.  
   
LYMPH NODES: No enlarged lymph nodes. VASCULATURE: Unremarkable GASTROINTESTINAL TRACT: No bowel dilation or wall thickening. Appendix is  
normal. There is no free air. PELVIC ORGANS: Unremarkable without free fluid BONES: No acute or aggressive osseous abnormalities identified. OTHER: None.  
   
_______________  
   
  
 09/26/18 2115  CT SPINE CERV WO CONT Final result Impression:  IMPRESSION:  
   
No acute fractures or listhesis Narrative:  EXAM: CT cervical spine INDICATION: Head trauma COMPARISON: None. TECHNIQUE: Axial CT imaging of the cervical spine was performed from the skull  
base to the thoracic inlet without intravenous contrast. Multiplanar reformats  
were generated. One or more dose reduction techniques were used on this CT:  
automated exposure control, adjustment of the Ams and/or kVp according to  
patient size, and iterative reconstruction techniques. The specific techniques  
used on this CT exam have been documented in the patient's electronic medical  
record.  
   
_______________ FINDINGS:  
   
VERTEBRAE AND DISCS: Vertebral alignment and articulation are within normal  
limits. Vertebral body and disc space heights are maintained. Specifically, no  
compression deformities are noted and there is no spondylolisthesis. No  
displaced fractures are identified. There are no significant areas of bone  
lucency or sclerosis. SPINAL CANAL AND FORAMINA: Patent without significant bony canal or foramina  
encroachment. PREVERTEBRAL SOFT TISSUES: Normal.  
   
VISIBLE PORTIONS OF POSTERIOR FOSSA/BRAIN: Normal.  
   
LUNG APICES: Clear. OTHER: None.  
   
_______________  
   
  
 09/26/18 2114  CT HEAD WO CONT Final result Impression:  IMPRESSION:  
   
No acute intracranial abnormalities. Narrative:  EXAM: CT head INDICATION: Fall head injury COMPARISON: None. TECHNIQUE: Axial CT imaging of the head was performed without intravenous  
contrast. One or more dose reduction techniques were used on this CT: automated  
exposure control, adjustment of the Ams and/or kVp according to patient size,  
and iterative reconstruction techniques. The specific techniques used on this CT exam have been documented in the patient's electronic medical record.  
   
_______________ FINDINGS:  
   
BRAIN AND POSTERIOR FOSSA: The sulci, folia, ventricles and basal cisterns are  
within normal limits for the patient's age. There is no intracranial hemorrhage,  
mass effect, or midline shift. There are no areas of abnormal parenchymal  
attenuation. EXTRA-AXIAL SPACES AND MENINGES: There are no abnormal extra-axial fluid  
collections. CALVARIUM: Intact. SINUSES: Clear. OTHER: None.  
   
_______________ CXR Results  (Last 48 hours) None Medical Decision Making I am the first provider for this patient. I reviewed the vital signs, available nursing notes, past medical history, past surgical history, family history and social history. Vital Signs-Reviewed the patient's vital signs.  
 
Pulse Oximetry Analysis - 100% on RA  
 
 Records Reviewed: Nursing Notes and Old Medical Records Provider Notes (Medical Decision Making):  
Ddx: Head or C-spine injury including fx, contusion, or bleeding. Scan chest to pelvis for vascular injury or contusion. Greatest concern is spinal column injury, lumbar. Possible but unlikely bowel injury. Greatest concern is solid organ injury. Procedures: 
Procedures MEDICATIONS GIVEN: 
Medications  
sodium chloride (NS) flush 5-10 mL (not administered)  
sodium chloride (NS) flush 5-10 mL (10 mL IntraVENous Given 9/26/18 2155) morphine injection 4 mg (4 mg IntraVENous Given 9/26/18 2010)  
ondansetron (ZOFRAN) injection 4 mg (4 mg IntraVENous Given 9/26/18 2011) iopamidol (ISOVUE 300) 61 % contrast injection 100 mL (100 mL IntraVENous Given 9/26/18 2100) morphine injection 4 mg (4 mg IntraVENous Given 9/26/18 2155) ED Course:  
7:25 PM  
Initial assessment performed. The patients presenting problems have been discussed, and they are in agreement with the care plan formulated and outlined with them. I have encouraged them to ask questions as they arise throughout their visit. Diagnosis and Disposition DISCHARGE NOTE: 
11:00 PM 
Edmundo EDITH Lyla Eugene  results have been reviewed with him. He has been counseled regarding his diagnosis, treatment, and plan. He verbally conveys understanding and agreement of the signs, symptoms, diagnosis, treatment and prognosis and additionally agrees to follow up as discussed. He also agrees with the care-plan and conveys that all of his questions have been answered. I have also provided discharge instructions for him that include: educational information regarding their diagnosis and treatment, and list of reasons why they would want to return to the ED prior to their follow-up appointment, should his condition change. He has been provided with education for proper emergency department utilization.   
 
 
CLINICAL IMPRESSION: 
 
 1. Cervical sprain, initial encounter 2. Lumbar contusion, initial encounter 3. Fall, initial encounter PLAN: 
1. D/C Home 2. Current Discharge Medication List  
  
START taking these medications Details  
ibuprofen (MOTRIN) 800 mg tablet Take 1 Tab by mouth every six (6) hours as needed for Pain for up to 7 days. Qty: 20 Tab, Refills: 0  
  
methocarbamol (ROBAXIN) 500 mg tablet Take 2 Tabs by mouth three (3) times daily. Qty: 30 Tab, Refills: 0 Associated Diagnoses: Cervical sprain, initial encounter; Lumbar contusion, initial encounter; Fall, initial encounter  
  
ondansetron hcl (ZOFRAN) 4 mg tablet Take 1 Tab by mouth every eight (8) hours as needed for Nausea. Qty: 12 Tab, Refills: 0  
  
acetaminophen-codeine (TYLENOL-CODEINE #3) 300-30 mg per tablet Take 1 Tab by mouth every four (4) hours as needed for Pain. Max Daily Amount: 6 Tabs. Qty: 6 Tab, Refills: 0 Associated Diagnoses: Cervical sprain, initial encounter; Lumbar contusion, initial encounter; Fall, initial encounter CONTINUE these medications which have NOT CHANGED Details  
fluticasone-salmeterol (ADVAIR DISKUS) 100-50 mcg/dose diskus inhaler Take 1 Puff by inhalation every twelve (12) hours. montelukast (SINGULAIR) 10 mg tablet Take 10 mg by mouth daily. dextroamphetamine-amphetamine (ADDERALL) 10 mg tablet Take 10 mg by mouth. albuterol (VENTOLIN HFA) 90 mcg/actuation inhaler Take  by inhalation. 3.  
Follow-up Information Follow up With Details Comments Contact Info [facility] Call in 1 day For primary care follow up [address] [facility], [address] [place]  [id number] [phone] [facility] EMERGENCY DEPT  As needed, If symptoms worsen [address] [place]  [id number] [phone]  
  
 
_______________________________ Attestations: This note is prepared by Kyaw English, acting as Scribe for [employer].  Beatriz Duff MD. 
 
 Xi Asher. Debi Kraft MD:  The scribe's documentation has been prepared under my direction and personally reviewed by me in its entirety. I confirm that the note above accurately reflects all work, treatment, procedures, and medical decision making performed by me. 
_______________________________

## 2018-09-26 NOTE — ED NOTES
Assume care of pt. Pt presents to ED with fallen from a 8 ft ladder. Having neck/and lower back pain tenderness/and pain. \"I may have passed out. \" No neuro deficit observed. Neck collar in place by Dr Eduardo Flores. Dr Eduardo Flores evaluating pt. NAD observed. A/O x 4, following commands.

## 2018-09-26 NOTE — ED TRIAGE NOTES
Patient had a fall today from an 8 foot ladder at approximately 1730. Patient reports he fell and hit his back directly, reports difficulty breathing after the fall. Patient reports his father told him he had a 13 second LOC. Patient ambulatory to triage room but will be placed in a wheelchair for transport to room.

## 2018-09-27 NOTE — ED NOTES
DR Susannah Naranjo is aware of pt asking for additional pain medication. Pt has been informed that Dr Susannah Naranjo is aware, and plans to look over his CT scan results.

## 2018-09-27 NOTE — ED NOTES
Pt medicated per orders to aide his 7/10 neck/and lower back pain. See MAR. Urinal at bedside for pt has been informed of the need of urine specimen for lab.

## 2018-09-27 NOTE — DISCHARGE INSTRUCTIONS
Neck Strain: Care Instructions  Your Care Instructions    You have strained the muscles and ligaments in your neck. A sudden, awkward movement can strain the neck. This often occurs with falls or car accidents or during certain sports. Everyday activities like working on a computer or sleeping can also cause neck strain if they force you to hold your neck in an awkward position for a long time. It is common for neck pain to get worse for a day or two after an injury, but it should start to feel better after that. You may have more pain and stiffness for several days before it gets better. This is expected. It may take a few weeks or longer for it to heal completely. Good home treatment can help you get better faster and avoid future neck problems. Follow-up care is a key part of your treatment and safety. Be sure to make and go to all appointments, and call your doctor if you are having problems. It's also a good idea to know your test results and keep a list of the medicines you take. How can you care for yourself at home? · If you were given a neck brace (cervical collar) to limit neck motion, wear it as instructed for as many days as your doctor tells you to. Do not wear it longer than you were told to. Wearing a brace for too long can make neck stiffness worse and weaken the neck muscles. · You can try using heat or ice to see if it helps. ¨ Try using a heating pad on a low or medium setting for 15 to 20 minutes every 2 to 3 hours. Try a warm shower in place of one session with the heating pad. You can also buy single-use heat wraps that last up to 8 hours. ¨ You can also try an ice pack for 10 to 15 minutes every 2 to 3 hours. · Take pain medicines exactly as directed. ¨ If the doctor gave you a prescription medicine for pain, take it as prescribed. ¨ If you are not taking a prescription pain medicine, ask your doctor if you can take an over-the-counter medicine.   · Gently rub the area to relieve pain and help with blood flow. Do not massage the area if it hurts to do so. · Do not do anything that makes the pain worse. Take it easy for a couple of days. You can do your usual activities if they do not hurt your neck or put it at risk for more stress or injury. · Try sleeping on a special neck pillow. Place it under your neck, not under your head. Placing a tightly rolled-up towel under your neck while you sleep will also work. If you use a neck pillow or rolled towel, do not use your regular pillow at the same time. · To prevent future neck pain, do exercises to stretch and strengthen your neck and back. Learn how to use good posture, safe lifting techniques, and proper body mechanics. When should you call for help? Call 911 anytime you think you may need emergency care. For example, call if:    · You are unable to move an arm or a leg at all.   [facility] your doctor now or seek immediate medical care if:    · You have new or worse symptoms in your arms, legs, chest, belly, or buttocks. Symptoms may include:  ¨ Numbness or tingling. ¨ Weakness. ¨ Pain.     · You lose bladder or bowel control.    Watch closely for changes in your health, and be sure to contact your doctor if:    · You are not getting better as expected. Where can you learn more? Go to http://dutch-griselda.info/. Enter M253 in the search box to learn more about \"Neck Strain: Care Instructions. \"  Current as of: November 29, 2017  Content Version: 11.7  © 6384-4929 [employer], Incorporated. Care instructions adapted under license by [employer] (which disclaims liability or warranty for this information). If you have questions about a medical condition or this instruction, always ask your healthcare professional. [place] [address] any warranty or liability for your use of this information.          Low Back Contusion: Care Instructions  Your Care Instructions    Contusion is the medical term for a bruise. When you have a low back bruise, it's often caused by a direct blow or an impact, such as falling against a counter or table. Bruises are common sports injuries. Most people think of a bruise as a black-and-blue spot. This happens when small blood vessels get torn and leak blood under the skin. But bones, muscles, and organs can also get bruised. If these deep tissues are damaged, you may not always see a bruise. The doctor will examine your bruise. You may also have tests to make sure you do not have a more serious injury, such as a broken bone or nerve damage. Tests may include X-rays or other imaging tests like a CT scan or MRI. Low back bruises may cause pain and swelling. But if there is no serious damage, they will often get better with home treatment in several days to a few weeks. The doctor has checked you carefully, but problems can develop later. If you notice any problems or new symptoms, get medical treatment right away. Follow-up care is a key part of your treatment and safety. Be sure to make and go to all appointments, and call your doctor if you are having problems. It's also a good idea to know your test results and keep a list of the medicines you take. How can you care for yourself at home? · Put ice or a cold pack on the sore area for 10 to 20 minutes at a time to stop swelling. Put a thin cloth between the ice pack and your skin. · Be safe with medicines. Read and follow all instructions on the label. ¨ If the doctor gave you a prescription medicine for pain, take it as prescribed. ¨ If you are not taking a prescription pain medicine, ask your doctor if you can take an over-the-counter medicine. · For the first day or two of pain, take it easy. But as soon as possible, get back to your normal daily life and activities. · Get gentle exercise, such as walking. Movement keeps your spine flexible and helps your muscles stay strong. When should you call for help?   Call 46 anytime you think you may need emergency care. For example, call if:    · You are unable to move a leg at all.   [facility] your doctor now or seek immediate medical care if:    · You have new or worse symptoms in your legs or buttocks. Symptoms may include:  ¨ Numbness or tingling. ¨ Weakness. ¨ Pain.     · You lose bladder or bowel control.     · You have blood in your urine.    Watch closely for changes in your health, and be sure to contact your doctor if:    · You do not get better as expected. Where can you learn more? Go to http://dutch-griselda.info/. Enter V337 in the search box to learn more about \"Low Back Contusion: Care Instructions. \"  Current as of: November 20, 2017  Content Version: 11.7  © 8015-2571 [employer]. Care instructions adapted under license by [employer] (which disclaims liability or warranty for this information). If you have questions about a medical condition or this instruction, always ask your healthcare professional. [address] any warranty or liability for your use of this information.

## 2018-09-27 NOTE — ED NOTES
Found pt up ad ange in room. Pt reporting 5/10 pain relief at the present. Pt to remain NPO until scans completed- pt aware. Pt encouraged to stay lying on the stretcher since has been medicated with pain medication. No NV. Pt voicing understanding. Pt awaits to be taken for his scans. NAD observed.

## 2018-09-27 NOTE — ED NOTES
Pt lying comfortable on the stretcher. Pt having 5/10 pain relief at the present of his lower/mid back pain. Pt requesting to speak with Dr Lakhwinder Ruiz. DR Lakhwinder Ruiz has been informed.

## 2018-09-27 NOTE — ED NOTES
Pt on the call bell again. Will remind Dr Lawanda Heimlich that pt continues to ask for additional pain medication.

## 2018-10-05 NOTE — CALL BACK NOTE
10:26 AM 
10/05/2018 Received call from [facility] pharmacy about Rx written by Dr. Leonel Jerry on back order. Pharmacist requesting substitution. Gave verbal for parafon forter 500 mg TID as needed for muscle spasms. #21. No refills.   
 
Brian Ferrara PA-C

## 2018-10-21 ENCOUNTER — ED HISTORICAL/CONVERTED ENCOUNTER (OUTPATIENT)
Dept: OTHER | Age: 23
End: 2018-10-21

## 2020-06-04 NOTE — PROGRESS NOTES
Error    A user error has taken place: charting done on wrong patient and has been corrected. atorvastatin 80 mg oral tablet: 1 tab(s) orally once a day  carbidopa-levodopa 50 mg-200 mg oral tablet, extended release: 1 tab(s) orally 4 times a day  clopidogrel 75 mg oral tablet: 1 tab(s) orally once a day (at bedtime)  DilTIAZem Hydrochloride  mg/24 hours oral capsule, extended release: 1 cap(s) orally once a day  hydroCHLOROthiazide 12.5 mg oral tablet: 1 tab(s) orally once a day  pramipexole 0.5 mg oral tablet: 1 tab(s) orally 3 times a day atorvastatin 80 mg oral tablet: 1 tab(s) orally once a day  bifidobacterium-lactobacillus oral capsule: 1 cap(s) orally 2 times a day   carbidopa-levodopa 50 mg-200 mg oral tablet, extended release: 1 tab(s) orally 4 times a day  cefpodoxime 200 mg oral tablet: 1 tab(s) orally 2 times a day   clopidogrel 75 mg oral tablet: 1 tab(s) orally once a day (at bedtime)  DilTIAZem Hydrochloride  mg/24 hours oral capsule, extended release: 1 cap(s) orally once a day  hydroCHLOROthiazide 12.5 mg oral tablet: 1 tab(s) orally once a day  ondansetron 4 mg oral disintegrating strip: 1 each orally every 8 hours, As Needed -for nausea   pramipexole 0.5 mg oral tablet: 1 tab(s) orally 3 times a day

## 2021-10-08 ENCOUNTER — OFFICE VISIT (OUTPATIENT)
Dept: URGENT CARE | Facility: CLINIC | Age: 26
End: 2021-10-08
Payer: MEDICAID

## 2021-10-08 VITALS
RESPIRATION RATE: 16 BRPM | BODY MASS INDEX: 21.42 KG/M2 | TEMPERATURE: 99 F | OXYGEN SATURATION: 98 % | HEART RATE: 80 BPM | HEIGHT: 71 IN | SYSTOLIC BLOOD PRESSURE: 180 MMHG | WEIGHT: 153 LBS | DIASTOLIC BLOOD PRESSURE: 90 MMHG

## 2021-10-08 DIAGNOSIS — Z87.09 HISTORY OF ASTHMA: Primary | ICD-10-CM

## 2021-10-08 DIAGNOSIS — Z76.89 ENCOUNTER TO ESTABLISH CARE: ICD-10-CM

## 2021-10-08 DIAGNOSIS — R03.0 ELEVATED BLOOD PRESSURE READING: ICD-10-CM

## 2021-10-08 DIAGNOSIS — F41.9 ANXIOUSNESS: ICD-10-CM

## 2021-10-08 PROCEDURE — 99203 PR OFFICE/OUTPT VISIT, NEW, LEVL III, 30-44 MIN: ICD-10-PCS | Mod: S$GLB,,, | Performed by: PHYSICIAN ASSISTANT

## 2021-10-08 PROCEDURE — 99203 OFFICE O/P NEW LOW 30 MIN: CPT | Mod: S$GLB,,, | Performed by: PHYSICIAN ASSISTANT

## 2021-10-08 RX ORDER — NEBULIZER AND COMPRESSOR
EACH MISCELLANEOUS
Qty: 1 EACH | Refills: 0 | Status: SHIPPED | OUTPATIENT
Start: 2021-10-08 | End: 2022-07-27

## 2021-10-08 RX ORDER — HYDROXYZINE HYDROCHLORIDE 25 MG/1
25 TABLET, FILM COATED ORAL 3 TIMES DAILY PRN
Qty: 30 TABLET | Refills: 3 | Status: SHIPPED | OUTPATIENT
Start: 2021-10-08 | End: 2022-05-10

## 2021-10-08 RX ORDER — ALBUTEROL SULFATE 90 UG/1
2 AEROSOL, METERED RESPIRATORY (INHALATION) EVERY 6 HOURS PRN
Qty: 18 G | Refills: 3 | Status: SHIPPED | OUTPATIENT
Start: 2021-10-08 | End: 2022-07-27

## 2021-10-08 RX ORDER — ALBUTEROL SULFATE 1.25 MG/3ML
1.25 SOLUTION RESPIRATORY (INHALATION) EVERY 6 HOURS PRN
Qty: 1 BOX | Refills: 1 | Status: SHIPPED | OUTPATIENT
Start: 2021-10-08 | End: 2022-07-27

## 2022-01-06 ENCOUNTER — TELEPHONE (OUTPATIENT)
Dept: FAMILY MEDICINE | Facility: CLINIC | Age: 27
End: 2022-01-06
Payer: MEDICAID

## 2022-01-06 NOTE — TELEPHONE ENCOUNTER
----- Message from Jen Seals sent at 1/6/2022 12:48 PM CST -----  Type:  Sooner Apoointment Request    Caller is requesting a sooner appointment.  Caller declined first available appointment listed below.  Caller will not accept being placed on the waitlist and is requesting a message be sent to doctor.    Name of Caller:  Pt  When is the first available appointment?    Symptoms:  Krohns Disease   Best Call Back Number:  [phone]  Additional Information:  Please Call and advise

## 2022-07-27 ENCOUNTER — HOSPITAL ENCOUNTER (INPATIENT)
Facility: HOSPITAL | Age: 27
LOS: 2 days | Discharge: HOME | DRG: 440 | End: 2022-07-29
Attending: EMERGENCY MEDICINE | Admitting: STUDENT IN AN ORGANIZED HEALTH CARE EDUCATION/TRAINING PROGRAM
Payer: MEDICAID

## 2022-07-27 ENCOUNTER — TELEPHONE (OUTPATIENT)
Dept: RADIOLOGY | Facility: HOSPITAL | Age: 27
End: 2022-07-27

## 2022-07-27 DIAGNOSIS — R07.9 CHEST PAIN: ICD-10-CM

## 2022-07-27 DIAGNOSIS — K85.20 ALCOHOL-INDUCED ACUTE PANCREATITIS, UNSPECIFIED COMPLICATION STATUS: Primary | ICD-10-CM

## 2022-07-27 PROBLEM — I10 ESSENTIAL HYPERTENSION: Status: ACTIVE | Noted: 2022-07-27

## 2022-07-27 PROBLEM — R74.8 ELEVATED LIVER ENZYMES: Status: ACTIVE | Noted: 2022-07-27

## 2022-07-27 PROBLEM — F43.10 PTSD (POST-TRAUMATIC STRESS DISORDER): Status: ACTIVE | Noted: 2022-07-27

## 2022-07-27 PROBLEM — F31.12 BIPOLAR AFFECTIVE DISORDER, CURRENTLY MANIC, MODERATE: Status: ACTIVE | Noted: 2022-07-27

## 2022-07-27 PROBLEM — K21.9 GERD (GASTROESOPHAGEAL REFLUX DISEASE): Status: ACTIVE | Noted: 2022-07-27

## 2022-07-27 LAB
ALBUMIN SERPL BCP-MCNC: 4.1 G/DL (ref 3.5–5.2)
ALP SERPL-CCNC: 75 U/L (ref 55–135)
ALT SERPL W/O P-5'-P-CCNC: 46 U/L (ref 10–44)
ANION GAP SERPL CALC-SCNC: 18 MMOL/L (ref 8–16)
AST SERPL-CCNC: 62 U/L (ref 10–40)
BASOPHILS # BLD AUTO: 0.01 K/UL (ref 0–0.2)
BASOPHILS NFR BLD: 0.2 % (ref 0–1.9)
BILIRUB SERPL-MCNC: 1.2 MG/DL (ref 0.1–1)
BUN SERPL-MCNC: 10 MG/DL (ref 6–20)
CALCIUM SERPL-MCNC: 9.6 MG/DL (ref 8.7–10.5)
CHLORIDE SERPL-SCNC: 102 MMOL/L (ref 95–110)
CO2 SERPL-SCNC: 18 MMOL/L (ref 23–29)
CREAT SERPL-MCNC: 0.8 MG/DL (ref 0.5–1.4)
DIFFERENTIAL METHOD: ABNORMAL
EOSINOPHIL # BLD AUTO: 0 K/UL (ref 0–0.5)
EOSINOPHIL NFR BLD: 0.4 % (ref 0–8)
ERYTHROCYTE [DISTWIDTH] IN BLOOD BY AUTOMATED COUNT: 11.2 % (ref 11.5–14.5)
EST. GFR  (AFRICAN AMERICAN): >60 ML/MIN/1.73 M^2
EST. GFR  (NON AFRICAN AMERICAN): >60 ML/MIN/1.73 M^2
ETHANOL SERPL-MCNC: <10 MG/DL
GLUCOSE SERPL-MCNC: 100 MG/DL (ref 70–110)
HCT VFR BLD AUTO: 45.4 % (ref 40–54)
HGB BLD-MCNC: 16.7 G/DL (ref 14–18)
IMM GRANULOCYTES # BLD AUTO: 0.01 K/UL (ref 0–0.04)
IMM GRANULOCYTES NFR BLD AUTO: 0.2 % (ref 0–0.5)
LIPASE SERPL-CCNC: 546 U/L (ref 4–60)
LYMPHOCYTES # BLD AUTO: 0.5 K/UL (ref 1–4.8)
LYMPHOCYTES NFR BLD: 9.3 % (ref 18–48)
MCH RBC QN AUTO: 35.5 PG (ref 27–31)
MCHC RBC AUTO-ENTMCNC: 36.8 G/DL (ref 32–36)
MCV RBC AUTO: 96 FL (ref 82–98)
MONOCYTES # BLD AUTO: 0.7 K/UL (ref 0.3–1)
MONOCYTES NFR BLD: 12.2 % (ref 4–15)
NEUTROPHILS # BLD AUTO: 4.4 K/UL (ref 1.8–7.7)
NEUTROPHILS NFR BLD: 77.7 % (ref 38–73)
NRBC BLD-RTO: 0 /100 WBC
PLATELET # BLD AUTO: 234 K/UL (ref 150–450)
PMV BLD AUTO: 10.2 FL (ref 9.2–12.9)
POTASSIUM SERPL-SCNC: 3.6 MMOL/L (ref 3.5–5.1)
PROT SERPL-MCNC: 7.5 G/DL (ref 6–8.4)
RBC # BLD AUTO: 4.71 M/UL (ref 4.6–6.2)
SARS-COV-2 RDRP RESP QL NAA+PROBE: NEGATIVE
SODIUM SERPL-SCNC: 138 MMOL/L (ref 136–145)
WBC # BLD AUTO: 5.59 K/UL (ref 3.9–12.7)

## 2022-07-27 PROCEDURE — G0378 HOSPITAL OBSERVATION PER HR: HCPCS

## 2022-07-27 PROCEDURE — 80053 COMPREHEN METABOLIC PANEL: CPT | Performed by: EMERGENCY MEDICINE

## 2022-07-27 PROCEDURE — 83690 ASSAY OF LIPASE: CPT | Performed by: EMERGENCY MEDICINE

## 2022-07-27 PROCEDURE — U0002 COVID-19 LAB TEST NON-CDC: HCPCS | Performed by: EMERGENCY MEDICINE

## 2022-07-27 PROCEDURE — 99285 EMERGENCY DEPT VISIT HI MDM: CPT | Mod: 25

## 2022-07-27 PROCEDURE — 63600175 PHARM REV CODE 636 W HCPCS: Performed by: EMERGENCY MEDICINE

## 2022-07-27 PROCEDURE — 11000001 HC ACUTE MED/SURG PRIVATE ROOM

## 2022-07-27 PROCEDURE — 63600175 PHARM REV CODE 636 W HCPCS: Performed by: NURSE PRACTITIONER

## 2022-07-27 PROCEDURE — 82077 ASSAY SPEC XCP UR&BREATH IA: CPT | Performed by: EMERGENCY MEDICINE

## 2022-07-27 PROCEDURE — 25000003 PHARM REV CODE 250: Performed by: EMERGENCY MEDICINE

## 2022-07-27 PROCEDURE — 96375 TX/PRO/DX INJ NEW DRUG ADDON: CPT

## 2022-07-27 PROCEDURE — 96376 TX/PRO/DX INJ SAME DRUG ADON: CPT

## 2022-07-27 PROCEDURE — 25000003 PHARM REV CODE 250: Performed by: NURSE PRACTITIONER

## 2022-07-27 PROCEDURE — 96361 HYDRATE IV INFUSION ADD-ON: CPT

## 2022-07-27 PROCEDURE — 85025 COMPLETE CBC W/AUTO DIFF WBC: CPT | Performed by: EMERGENCY MEDICINE

## 2022-07-27 PROCEDURE — 96374 THER/PROPH/DIAG INJ IV PUSH: CPT

## 2022-07-27 RX ORDER — TALC
9 POWDER (GRAM) TOPICAL NIGHTLY PRN
Status: DISCONTINUED | OUTPATIENT
Start: 2022-07-27 | End: 2022-07-29 | Stop reason: HOSPADM

## 2022-07-27 RX ORDER — FENTANYL CITRATE 50 UG/ML
50 INJECTION, SOLUTION INTRAMUSCULAR; INTRAVENOUS
Status: COMPLETED | OUTPATIENT
Start: 2022-07-27 | End: 2022-07-27

## 2022-07-27 RX ORDER — CLONIDINE HYDROCHLORIDE 0.1 MG/1
0.1 TABLET ORAL DAILY PRN
Status: DISCONTINUED | OUTPATIENT
Start: 2022-07-27 | End: 2022-07-29 | Stop reason: HOSPADM

## 2022-07-27 RX ORDER — HYDROCHLOROTHIAZIDE 12.5 MG/1
12.5 TABLET ORAL DAILY
Status: DISCONTINUED | OUTPATIENT
Start: 2022-07-28 | End: 2022-07-29 | Stop reason: HOSPADM

## 2022-07-27 RX ORDER — FOLIC ACID 1 MG/1
1000 TABLET ORAL DAILY
Status: DISCONTINUED | OUTPATIENT
Start: 2022-07-28 | End: 2022-07-27 | Stop reason: SDUPTHER

## 2022-07-27 RX ORDER — NALOXONE HCL 0.4 MG/ML
0.02 VIAL (ML) INJECTION
Status: DISCONTINUED | OUTPATIENT
Start: 2022-07-27 | End: 2022-07-29 | Stop reason: HOSPADM

## 2022-07-27 RX ORDER — SODIUM,POTASSIUM PHOSPHATES 280-250MG
2 POWDER IN PACKET (EA) ORAL
Status: DISCONTINUED | OUTPATIENT
Start: 2022-07-27 | End: 2022-07-29 | Stop reason: HOSPADM

## 2022-07-27 RX ORDER — HYDROMORPHONE HYDROCHLORIDE 2 MG/ML
0.5 INJECTION, SOLUTION INTRAMUSCULAR; INTRAVENOUS; SUBCUTANEOUS
Status: COMPLETED | OUTPATIENT
Start: 2022-07-27 | End: 2022-07-27

## 2022-07-27 RX ORDER — POLYETHYLENE GLYCOL 3350 17 G/17G
17 POWDER, FOR SOLUTION ORAL DAILY
Status: DISCONTINUED | OUTPATIENT
Start: 2022-07-28 | End: 2022-07-29 | Stop reason: HOSPADM

## 2022-07-27 RX ORDER — MORPHINE SULFATE 4 MG/ML
4 INJECTION, SOLUTION INTRAMUSCULAR; INTRAVENOUS EVERY 4 HOURS PRN
Status: DISCONTINUED | OUTPATIENT
Start: 2022-07-27 | End: 2022-07-29 | Stop reason: HOSPADM

## 2022-07-27 RX ORDER — GLUCAGON 1 MG
1 KIT INJECTION
Status: DISCONTINUED | OUTPATIENT
Start: 2022-07-27 | End: 2022-07-29 | Stop reason: HOSPADM

## 2022-07-27 RX ORDER — ONDANSETRON 2 MG/ML
8 INJECTION INTRAMUSCULAR; INTRAVENOUS EVERY 6 HOURS PRN
Status: DISCONTINUED | OUTPATIENT
Start: 2022-07-27 | End: 2022-07-29 | Stop reason: HOSPADM

## 2022-07-27 RX ORDER — LORAZEPAM 2 MG/ML
1 INJECTION INTRAMUSCULAR
Status: COMPLETED | OUTPATIENT
Start: 2022-07-27 | End: 2022-07-27

## 2022-07-27 RX ORDER — ONDANSETRON 2 MG/ML
4 INJECTION INTRAMUSCULAR; INTRAVENOUS
Status: COMPLETED | OUTPATIENT
Start: 2022-07-27 | End: 2022-07-27

## 2022-07-27 RX ORDER — IBUPROFEN 200 MG
24 TABLET ORAL
Status: DISCONTINUED | OUTPATIENT
Start: 2022-07-27 | End: 2022-07-29 | Stop reason: HOSPADM

## 2022-07-27 RX ORDER — ACETAMINOPHEN 325 MG/1
650 TABLET ORAL EVERY 4 HOURS PRN
Status: DISCONTINUED | OUTPATIENT
Start: 2022-07-27 | End: 2022-07-29 | Stop reason: HOSPADM

## 2022-07-27 RX ORDER — DIAZEPAM 5 MG/1
10 TABLET ORAL EVERY 6 HOURS PRN
Status: DISCONTINUED | OUTPATIENT
Start: 2022-07-27 | End: 2022-07-29 | Stop reason: HOSPADM

## 2022-07-27 RX ORDER — CLONIDINE HYDROCHLORIDE 0.1 MG/1
0.1 TABLET ORAL DAILY PRN
COMMUNITY
Start: 2022-07-15

## 2022-07-27 RX ORDER — SODIUM CHLORIDE 9 MG/ML
INJECTION, SOLUTION INTRAVENOUS CONTINUOUS
Status: DISCONTINUED | OUTPATIENT
Start: 2022-07-27 | End: 2022-07-29 | Stop reason: HOSPADM

## 2022-07-27 RX ORDER — VALSARTAN AND HYDROCHLOROTHIAZIDE 320; 12.5 MG/1; MG/1
1 TABLET, FILM COATED ORAL DAILY
COMMUNITY
Start: 2022-07-22

## 2022-07-27 RX ORDER — LANOLIN ALCOHOL/MO/W.PET/CERES
800 CREAM (GRAM) TOPICAL
Status: DISCONTINUED | OUTPATIENT
Start: 2022-07-27 | End: 2022-07-29 | Stop reason: HOSPADM

## 2022-07-27 RX ORDER — PANTOPRAZOLE SODIUM 40 MG/1
40 TABLET, DELAYED RELEASE ORAL DAILY
Status: DISCONTINUED | OUTPATIENT
Start: 2022-07-28 | End: 2022-07-29 | Stop reason: HOSPADM

## 2022-07-27 RX ORDER — HYDROMORPHONE HYDROCHLORIDE 2 MG/ML
1 INJECTION, SOLUTION INTRAMUSCULAR; INTRAVENOUS; SUBCUTANEOUS EVERY 4 HOURS PRN
Status: DISCONTINUED | OUTPATIENT
Start: 2022-07-27 | End: 2022-07-28

## 2022-07-27 RX ORDER — IBUPROFEN 200 MG
16 TABLET ORAL
Status: DISCONTINUED | OUTPATIENT
Start: 2022-07-27 | End: 2022-07-29 | Stop reason: HOSPADM

## 2022-07-27 RX ORDER — MAG HYDROX/ALUMINUM HYD/SIMETH 200-200-20
30 SUSPENSION, ORAL (FINAL DOSE FORM) ORAL 4 TIMES DAILY PRN
Status: DISCONTINUED | OUTPATIENT
Start: 2022-07-27 | End: 2022-07-29 | Stop reason: HOSPADM

## 2022-07-27 RX ORDER — VENLAFAXINE HYDROCHLORIDE 37.5 MG/1
75 CAPSULE, EXTENDED RELEASE ORAL DAILY
Status: DISCONTINUED | OUTPATIENT
Start: 2022-07-28 | End: 2022-07-29 | Stop reason: HOSPADM

## 2022-07-27 RX ORDER — SIMETHICONE 80 MG
1 TABLET,CHEWABLE ORAL 4 TIMES DAILY PRN
Status: DISCONTINUED | OUTPATIENT
Start: 2022-07-27 | End: 2022-07-29 | Stop reason: HOSPADM

## 2022-07-27 RX ORDER — ACETAMINOPHEN 325 MG/1
650 TABLET ORAL EVERY 8 HOURS PRN
Status: DISCONTINUED | OUTPATIENT
Start: 2022-07-27 | End: 2022-07-29 | Stop reason: HOSPADM

## 2022-07-27 RX ORDER — METOPROLOL TARTRATE 25 MG/1
25 TABLET, FILM COATED ORAL 2 TIMES DAILY
Status: DISCONTINUED | OUTPATIENT
Start: 2022-07-27 | End: 2022-07-29 | Stop reason: HOSPADM

## 2022-07-27 RX ORDER — HYDROMORPHONE HYDROCHLORIDE 2 MG/ML
1 INJECTION, SOLUTION INTRAMUSCULAR; INTRAVENOUS; SUBCUTANEOUS
Status: COMPLETED | OUTPATIENT
Start: 2022-07-27 | End: 2022-07-27

## 2022-07-27 RX ORDER — VALSARTAN 80 MG/1
320 TABLET ORAL DAILY
Status: DISCONTINUED | OUTPATIENT
Start: 2022-07-28 | End: 2022-07-29 | Stop reason: HOSPADM

## 2022-07-27 RX ORDER — CLONAZEPAM 0.5 MG/1
0.5 TABLET ORAL 2 TIMES DAILY PRN
Status: DISCONTINUED | OUTPATIENT
Start: 2022-07-27 | End: 2022-07-29 | Stop reason: HOSPADM

## 2022-07-27 RX ORDER — FOLIC ACID 1 MG/1
1 TABLET ORAL DAILY
Status: DISCONTINUED | OUTPATIENT
Start: 2022-07-28 | End: 2022-07-29 | Stop reason: HOSPADM

## 2022-07-27 RX ORDER — OXYCODONE AND ACETAMINOPHEN 10; 325 MG/1; MG/1
1 TABLET ORAL EVERY 4 HOURS PRN
Status: DISCONTINUED | OUTPATIENT
Start: 2022-07-27 | End: 2022-07-29 | Stop reason: HOSPADM

## 2022-07-27 RX ORDER — SODIUM CHLORIDE 0.9 % (FLUSH) 0.9 %
3 SYRINGE (ML) INJECTION EVERY 12 HOURS PRN
Status: DISCONTINUED | OUTPATIENT
Start: 2022-07-27 | End: 2022-07-29 | Stop reason: HOSPADM

## 2022-07-27 RX ADMIN — SODIUM CHLORIDE: 0.9 INJECTION, SOLUTION INTRAVENOUS at 07:07

## 2022-07-27 RX ADMIN — OXYCODONE AND ACETAMINOPHEN 1 TABLET: 10; 325 TABLET ORAL at 09:07

## 2022-07-27 RX ADMIN — DIAZEPAM 10 MG: 5 TABLET ORAL at 08:07

## 2022-07-27 RX ADMIN — POTASSIUM BICARBONATE 50 MEQ: 977.5 TABLET, EFFERVESCENT ORAL at 10:07

## 2022-07-27 RX ADMIN — METOPROLOL TARTRATE 25 MG: 25 TABLET, FILM COATED ORAL at 08:07

## 2022-07-27 RX ADMIN — SODIUM CHLORIDE 1000 ML: 0.9 INJECTION, SOLUTION INTRAVENOUS at 04:07

## 2022-07-27 RX ADMIN — HYDROMORPHONE HYDROCHLORIDE 1 MG: 2 INJECTION, SOLUTION INTRAMUSCULAR; INTRAVENOUS; SUBCUTANEOUS at 05:07

## 2022-07-27 RX ADMIN — FENTANYL CITRATE 50 MCG: 50 INJECTION INTRAMUSCULAR; INTRAVENOUS at 06:07

## 2022-07-27 RX ADMIN — ONDANSETRON 4 MG: 2 INJECTION INTRAMUSCULAR; INTRAVENOUS at 04:07

## 2022-07-27 RX ADMIN — LORAZEPAM 1 MG: 2 INJECTION INTRAMUSCULAR; INTRAVENOUS at 04:07

## 2022-07-27 RX ADMIN — HYDROMORPHONE HYDROCHLORIDE 1 MG: 2 INJECTION INTRAMUSCULAR; INTRAVENOUS; SUBCUTANEOUS at 07:07

## 2022-07-27 RX ADMIN — HYDROMORPHONE HYDROCHLORIDE 0.5 MG: 2 INJECTION, SOLUTION INTRAMUSCULAR; INTRAVENOUS; SUBCUTANEOUS at 04:07
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
## 2022-07-27 NOTE — ED NOTES
Assumed care:  Jori Bowman is awake, alert and oriented x 3, skin warm and dry, in NAD.  Patient with history of pancreatitis, CO abd pain with N&V that started 3 weeks ago.  States he went to [facility] and was referred to specialist but has been unable to get an appt.    Patient identifiers for Jori Bowman checked and correct.  LOC:  Jori Bowman is awake, alert, and aware of environment with an appropriate affect. He is oriented x 3 and speaking appropriately.  APPEARANCE:  He is resting comfortably and in no acute distress. He is clean and well groomed, patient's clothing is properly fastened.  SKIN:  The skin is warm and dry. He has normal skin turgor and moist mucus membranes. Skin is intact; no bruising or breakdown noted.  MUSCULOSKELETAL:  He is moving all extremities well, no obvious deformities noted. Pulses intact.   RESPIRATORY:  Airway is open and patent. Respirations are spontaneous and non-labored with normal effort and rate.  CARDIAC:  He has a normal rate and rhythm. No peripheral edema noted. Capillary refill < 3 seconds.  ABDOMEN:  No distention noted.  Soft and non-tender upon palpation.  abd pain, N&V  NEUROLOGICAL:  PERRL. Facial expression is symmetrical. Hand grasps are equal bilaterally. Normal sensation in all extremities when touched with finger.  Allergies reported:    Review of patient's allergies indicates:   Allergen Reactions    Abilify [aripiprazole]     Nsaids (non-steroidal anti-inflammatory drug) Diarrhea     OTHER NOTES:  States that he drinks 2 alcohol drinks per day and last drink was yesterday.

## 2022-07-28 LAB
ALBUMIN SERPL BCP-MCNC: 4 G/DL (ref 3.5–5.2)
ALP SERPL-CCNC: 68 U/L (ref 55–135)
ALT SERPL W/O P-5'-P-CCNC: 42 U/L (ref 10–44)
ANION GAP SERPL CALC-SCNC: 15 MMOL/L (ref 8–16)
AST SERPL-CCNC: 47 U/L (ref 10–40)
BASOPHILS # BLD AUTO: 0.02 K/UL (ref 0–0.2)
BASOPHILS NFR BLD: 0.3 % (ref 0–1.9)
BILIRUB SERPL-MCNC: 1.2 MG/DL (ref 0.1–1)
BUN SERPL-MCNC: 6 MG/DL (ref 6–20)
CALCIUM SERPL-MCNC: 9.7 MG/DL (ref 8.7–10.5)
CHLORIDE SERPL-SCNC: 98 MMOL/L (ref 95–110)
CO2 SERPL-SCNC: 26 MMOL/L (ref 23–29)
CREAT SERPL-MCNC: 0.7 MG/DL (ref 0.5–1.4)
DIFFERENTIAL METHOD: ABNORMAL
EOSINOPHIL # BLD AUTO: 0.1 K/UL (ref 0–0.5)
EOSINOPHIL NFR BLD: 1.1 % (ref 0–8)
ERYTHROCYTE [DISTWIDTH] IN BLOOD BY AUTOMATED COUNT: 11.3 % (ref 11.5–14.5)
EST. GFR  (AFRICAN AMERICAN): >60 ML/MIN/1.73 M^2
EST. GFR  (NON AFRICAN AMERICAN): >60 ML/MIN/1.73 M^2
GLUCOSE SERPL-MCNC: 77 MG/DL (ref 70–110)
HCT VFR BLD AUTO: 41.9 % (ref 40–54)
HGB BLD-MCNC: 15 G/DL (ref 14–18)
IMM GRANULOCYTES # BLD AUTO: 0.02 K/UL (ref 0–0.04)
IMM GRANULOCYTES NFR BLD AUTO: 0.3 % (ref 0–0.5)
LIPASE SERPL-CCNC: 210 U/L (ref 4–60)
LYMPHOCYTES # BLD AUTO: 1 K/UL (ref 1–4.8)
LYMPHOCYTES NFR BLD: 16.2 % (ref 18–48)
MAGNESIUM SERPL-MCNC: 1.6 MG/DL (ref 1.6–2.6)
MCH RBC QN AUTO: 35 PG (ref 27–31)
MCHC RBC AUTO-ENTMCNC: 35.8 G/DL (ref 32–36)
MCV RBC AUTO: 98 FL (ref 82–98)
MONOCYTES # BLD AUTO: 0.6 K/UL (ref 0.3–1)
MONOCYTES NFR BLD: 9.9 % (ref 4–15)
NEUTROPHILS # BLD AUTO: 4.6 K/UL (ref 1.8–7.7)
NEUTROPHILS NFR BLD: 72.2 % (ref 38–73)
NRBC BLD-RTO: 0 /100 WBC
PHOSPHATE SERPL-MCNC: 2 MG/DL (ref 2.7–4.5)
PLATELET # BLD AUTO: 188 K/UL (ref 150–450)
PMV BLD AUTO: 10.5 FL (ref 9.2–12.9)
POTASSIUM SERPL-SCNC: 3.6 MMOL/L (ref 3.5–5.1)
PROT SERPL-MCNC: 7.4 G/DL (ref 6–8.4)
RBC # BLD AUTO: 4.29 M/UL (ref 4.6–6.2)
SODIUM SERPL-SCNC: 139 MMOL/L (ref 136–145)
WBC # BLD AUTO: 6.37 K/UL (ref 3.9–12.7)

## 2022-07-28 PROCEDURE — 63600175 PHARM REV CODE 636 W HCPCS: Performed by: NURSE PRACTITIONER

## 2022-07-28 PROCEDURE — 83690 ASSAY OF LIPASE: CPT | Performed by: NURSE PRACTITIONER

## 2022-07-28 PROCEDURE — 25000003 PHARM REV CODE 250: Performed by: NURSE PRACTITIONER

## 2022-07-28 PROCEDURE — 36415 COLL VENOUS BLD VENIPUNCTURE: CPT | Performed by: NURSE PRACTITIONER

## 2022-07-28 PROCEDURE — 85025 COMPLETE CBC W/AUTO DIFF WBC: CPT | Performed by: NURSE PRACTITIONER

## 2022-07-28 PROCEDURE — 84100 ASSAY OF PHOSPHORUS: CPT | Performed by: NURSE PRACTITIONER

## 2022-07-28 PROCEDURE — 80053 COMPREHEN METABOLIC PANEL: CPT | Performed by: NURSE PRACTITIONER

## 2022-07-28 PROCEDURE — 83735 ASSAY OF MAGNESIUM: CPT | Performed by: NURSE PRACTITIONER

## 2022-07-28 PROCEDURE — 11000001 HC ACUTE MED/SURG PRIVATE ROOM

## 2022-07-28 RX ORDER — HYDROMORPHONE HYDROCHLORIDE 1 MG/ML
1 INJECTION, SOLUTION INTRAMUSCULAR; INTRAVENOUS; SUBCUTANEOUS
Status: DISCONTINUED | OUTPATIENT
Start: 2022-07-28 | End: 2022-07-29

## 2022-07-28 RX ADMIN — DIAZEPAM 10 MG: 5 TABLET ORAL at 08:07

## 2022-07-28 RX ADMIN — PANTOPRAZOLE SODIUM 40 MG: 40 TABLET, DELAYED RELEASE ORAL at 08:07

## 2022-07-28 RX ADMIN — HYDROMORPHONE HYDROCHLORIDE 1 MG: 1 INJECTION, SOLUTION INTRAMUSCULAR; INTRAVENOUS; SUBCUTANEOUS at 03:07

## 2022-07-28 RX ADMIN — THERA TABS 1 TABLET: TAB at 08:07

## 2022-07-28 RX ADMIN — VALSARTAN 320 MG: 80 TABLET, FILM COATED ORAL at 08:07

## 2022-07-28 RX ADMIN — OXYCODONE AND ACETAMINOPHEN 1 TABLET: 10; 325 TABLET ORAL at 10:07

## 2022-07-28 RX ADMIN — HYDROMORPHONE HYDROCHLORIDE 1 MG: 1 INJECTION, SOLUTION INTRAMUSCULAR; INTRAVENOUS; SUBCUTANEOUS at 12:07

## 2022-07-28 RX ADMIN — OXYCODONE AND ACETAMINOPHEN 1 TABLET: 10; 325 TABLET ORAL at 06:07

## 2022-07-28 RX ADMIN — HYDROMORPHONE HYDROCHLORIDE 1 MG: 1 INJECTION, SOLUTION INTRAMUSCULAR; INTRAVENOUS; SUBCUTANEOUS at 06:07

## 2022-07-28 RX ADMIN — FOLIC ACID 1 MG: 1 TABLET ORAL at 08:07

## 2022-07-28 RX ADMIN — SODIUM CHLORIDE: 0.9 INJECTION, SOLUTION INTRAVENOUS at 12:07

## 2022-07-28 RX ADMIN — CLONAZEPAM 0.5 MG: 0.5 TABLET ORAL at 02:07

## 2022-07-28 RX ADMIN — HYDROMORPHONE HYDROCHLORIDE 1 MG: 2 INJECTION INTRAMUSCULAR; INTRAVENOUS; SUBCUTANEOUS at 08:07

## 2022-07-28 RX ADMIN — HYDROMORPHONE HYDROCHLORIDE 1 MG: 2 INJECTION INTRAMUSCULAR; INTRAVENOUS; SUBCUTANEOUS at 04:07

## 2022-07-28 RX ADMIN — SODIUM CHLORIDE: 0.9 INJECTION, SOLUTION INTRAVENOUS at 04:07

## 2022-07-28 RX ADMIN — ALUMINUM HYDROXIDE, MAGNESIUM HYDROXIDE, AND SIMETHICONE 30 ML: 200; 200; 20 SUSPENSION ORAL at 02:07

## 2022-07-28 RX ADMIN — CLONIDINE HYDROCHLORIDE 0.1 MG: 0.1 TABLET ORAL at 02:07

## 2022-07-28 RX ADMIN — HYDROCHLOROTHIAZIDE 12.5 MG: 12.5 TABLET ORAL at 08:07

## 2022-07-28 RX ADMIN — HYDROMORPHONE HYDROCHLORIDE 1 MG: 1 INJECTION, SOLUTION INTRAMUSCULAR; INTRAVENOUS; SUBCUTANEOUS at 09:07

## 2022-07-28 RX ADMIN — SODIUM CHLORIDE: 0.9 INJECTION, SOLUTION INTRAVENOUS at 09:07

## 2022-07-28 RX ADMIN — ALUMINUM HYDROXIDE, MAGNESIUM HYDROXIDE, AND SIMETHICONE 30 ML: 200; 200; 20 SUSPENSION ORAL at 06:07

## 2022-07-28 RX ADMIN — OXYCODONE AND ACETAMINOPHEN 1 TABLET: 10; 325 TABLET ORAL at 02:07

## 2022-07-28 RX ADMIN — METOPROLOL TARTRATE 25 MG: 25 TABLET, FILM COATED ORAL at 08:07

## 2022-07-28 RX ADMIN — HYDROMORPHONE HYDROCHLORIDE 1 MG: 2 INJECTION INTRAMUSCULAR; INTRAVENOUS; SUBCUTANEOUS at 12:07

## 2022-07-28 RX ADMIN — OXYCODONE AND ACETAMINOPHEN 1 TABLET: 10; 325 TABLET ORAL at 07:07

## 2022-07-28 NOTE — SUBJECTIVE & OBJECTIVE
"Interval History: Pt seen and examined. Labs and diagnostics reviewed. Pt c/o severe abd pain. Denies n/v. Requesting pain meds, valium. Discussed his ETOH use and its connection to pancreatitis. Also informed him of his elev T bili @ 1.2 and AST 47 and ETOH affects on liver.     Discussed life events and PMH  status, PTSD dx, depressions, recent losses of friends due to suicide. Pt reported he does see MH counselor at [facility] and is cutting down on ETOH "which he only uses to help him sleep", denies he is alcoholic.     Requesting his gabapentin be resumed and wants to eat. Discussed rationale behind NPO status with pancreatitis. Will begin cl liq after pt has US.     No signs of ETOH withdrawal.     Review of Systems   Constitutional:  Positive for activity change and appetite change. Negative for chills, diaphoresis, fatigue and fever.   HENT:  Negative for congestion, postnasal drip and sore throat.    Respiratory:  Negative for cough and shortness of breath.    Cardiovascular:  Negative for chest pain and palpitations.   Gastrointestinal:  Positive for abdominal pain. Negative for abdominal distention, constipation, diarrhea, nausea and vomiting.   Genitourinary:  Negative for dysuria.   Musculoskeletal:  Negative for arthralgias, joint swelling and myalgias.   Skin:  Negative for color change.   Neurological:  Negative for dizziness and weakness.   Psychiatric/Behavioral:  Positive for dysphoric mood. Negative for agitation, behavioral problems and confusion.    Objective:     Vital Signs (Most Recent):  Temp: 97.6 °F (36.4 °C) (07/28/22 0700)  Pulse: 70 (07/28/22 0700)  Resp: 19 (07/28/22 0849)  BP: (!) 187/100 (07/28/22 0700)  SpO2: 99 % (07/28/22 0700)   Vital Signs (24h Range):  Temp:  [97.6 °F (36.4 °C)-99 °F (37.2 °C)] 97.6 °F (36.4 °C)  Pulse:  [] 70  Resp:  [16-20] 19  SpO2:  [95 %-99 %] 99 %  BP: (155-216)/() 187/100     Weight: 73.8 kg (162 lb 11.2 oz)  Body mass index is 22.69 " kg/m².    Intake/Output Summary (Last 24 hours) at 7/28/2022 1043  Last data filed at 7/28/2022 0500  Gross per 24 hour   Intake 2156.25 ml   Output 775 ml   Net 1381.25 ml      Physical Exam  Vitals and nursing note reviewed.   Constitutional:       Appearance: He is ill-appearing.   HENT:      Head: Normocephalic and atraumatic.      Nose: Nose normal.      Mouth/Throat:      Pharynx: Oropharynx is clear.   Eyes:      Conjunctiva/sclera: Conjunctivae normal.   Cardiovascular:      Rate and Rhythm: Normal rate and regular rhythm.      Heart sounds: No murmur heard.  Pulmonary:      Effort: Pulmonary effort is normal.      Breath sounds: Normal breath sounds.   Abdominal:      General: Bowel sounds are normal.      Tenderness: There is abdominal tenderness. There is no guarding or rebound.      Comments: Mild tenderness right and left periumbilical region and LUQ   Musculoskeletal:         General: Normal range of motion.      Cervical back: Normal range of motion.      Right lower leg: No edema.      Left lower leg: No edema.   Skin:     General: Skin is warm.      Capillary Refill: Capillary refill takes less than 2 seconds.   Neurological:      Mental Status: He is alert and oriented to person, place, and time.   Psychiatric:      Comments: Mood labile       Significant Labs: All pertinent labs within the past 24 hours have been reviewed.  CBC:   Recent Labs   Lab 07/27/22  1627 07/28/22  0500   WBC 5.59 6.37   HGB 16.7 15.0   HCT 45.4 41.9    188     CMP:   Recent Labs   Lab 07/27/22  1658 07/28/22  0500    139   K 3.6 3.6    98   CO2 18* 26    77   BUN 10 6   CREATININE 0.8 0.7   CALCIUM 9.6 9.7   PROT 7.5 7.4   ALBUMIN 4.1 4.0   BILITOT 1.2* 1.2*   ALKPHOS 75 68   AST 62* 47*   ALT 46* 42   ANIONGAP 18* 15   EGFRNONAA >60 >60     Magnesium:   Recent Labs   Lab 07/28/22  0500   MG 1.6     7/28/2022  Phosphorus 2.7 - 4.5 mg/dL 2.0 Low      Significant Imaging: I have reviewed all  pertinent imaging results/findings within the past 24 hours.

## 2022-07-28 NOTE — ASSESSMENT & PLAN NOTE
Admit to obs  NPO until no longer nauseous, then advance to clear liquids  Monitor CMP  IVF NS @125/hr  IV pain control  IV antiemetics  CIWA  diazepam

## 2022-07-28 NOTE — SUBJECTIVE & OBJECTIVE
Past Medical History:   Diagnosis Date    ADHD (attention deficit hyperactivity disorder)     Alcohol-induced chronic pancreatitis     Anxiety     Asthma     Heart murmur     Hypertension     Obsessive-compulsive disorder     PTSD (post-traumatic stress disorder) 2014    Patient states that he believes he needs to be back on meds for PTSD       History reviewed. No pertinent surgical history.    Review of patient's allergies indicates:   Allergen Reactions    Abilify [aripiprazole]     Nsaids (non-steroidal anti-inflammatory drug) Diarrhea       No current facility-administered medications on file prior to encounter.     Current Outpatient Medications on File Prior to Encounter   Medication Sig    clonazePAM (KLONOPIN) 0.5 MG tablet Take 1 tablet (0.5 mg total) by mouth 2 (two) times daily as needed for Anxiety.    cloNIDine (CATAPRES) 0.1 MG tablet Take 0.1 mg by mouth daily as needed.    folic acid (FOLVITE) 1 MG tablet Take 1,000 mcg by mouth once daily.    metoprolol tartrate (LOPRESSOR) 25 MG tablet Take 25 mg by mouth 2 (two) times daily.    omeprazole (PRILOSEC) 40 MG capsule Take 40 mg by mouth once daily.    ondansetron (ZOFRAN-ODT) 8 MG TbDL Take 8 mg by mouth 3 (three) times daily as needed.    valsartan-hydrochlorothiazide (DIOVAN-HCT) 320-12.5 mg per tablet Take 1 tablet by mouth once daily.    venlafaxine (EFFEXOR-XR) 75 MG 24 hr capsule Take 1 capsule (75 mg total) by mouth once daily.    [DISCONTINUED] albuterol (ACCUNEB) 1.25 mg/3 mL Nebu Take 3 mLs (1.25 mg total) by nebulization every 6 (six) hours as needed (for wheezing). Rescue    [DISCONTINUED] albuterol (PROVENTIL HFA) 90 mcg/actuation inhaler Inhale 2 puffs into the lungs every 6 (six) hours as needed for Wheezing. Rescue    [DISCONTINUED] gabapentin (NEURONTIN) 300 MG capsule Take 300 mg by mouth nightly.    [DISCONTINUED] lisinopriL (PRINIVIL,ZESTRIL) 5 MG tablet Take 5 mg by mouth once daily.    [DISCONTINUED] MIRALAX 17 gram/dose powder  As directed on prep sheet.    [DISCONTINUED] nebulizer and compressor Federica Nebulizer machine and kit. Use 4 times daily as needed for wheezing.    [DISCONTINUED] oxyCODONE-acetaminophen (PERCOCET)  mg per tablet Take 1 tablet by mouth every 4 (four) hours as needed for Pain.    [DISCONTINUED] promethazine (PHENERGAN) 12.5 MG Tab Take 25 mg by mouth 4 (four) times daily.     Family History       Problem Relation (Age of Onset)    Heart disease Mother, Father          Tobacco Use    Smoking status: Never Smoker    Smokeless tobacco: Never Used   Substance and Sexual Activity    Alcohol use: Yes     Comment: 1.7 liter of vodka over a week    Drug use: Not on file    Sexual activity: Not on file     Review of Systems   Constitutional:  Negative for chills and fever.   HENT:  Negative for congestion and sore throat.    Eyes:  Negative for visual disturbance.   Respiratory:  Negative for cough and shortness of breath.    Cardiovascular:  Negative for chest pain and palpitations.   Gastrointestinal:  Positive for abdominal pain, nausea and vomiting. Negative for constipation and diarrhea.   Endocrine: Negative for cold intolerance and heat intolerance.   Genitourinary:  Negative for dysuria and hematuria.   Musculoskeletal:  Negative for arthralgias and myalgias.   Skin:  Negative for rash.   Neurological:  Positive for tremors (mildly shaky). Negative for seizures.   Hematological:  Negative for adenopathy. Does not bruise/bleed easily.   All other systems reviewed and are negative.  Objective:     Vital Signs (Most Recent):  Temp: 98.5 °F (36.9 °C) (07/27/22 1557)  Pulse: 106 (07/27/22 1933)  Resp: 18 (07/27/22 1946)  BP: (!) 177/94 (07/27/22 1933)  SpO2: 97 % (07/27/22 1933)   Vital Signs (24h Range):  Temp:  [98.5 °F (36.9 °C)] 98.5 °F (36.9 °C)  Pulse:  [] 106  Resp:  [16-20] 18  SpO2:  [95 %-99 %] 97 %  BP: (155-216)/() 177/94     Weight: 74.4 kg (164 lb)  Body mass index is 22.87 kg/m².    Physical  Exam  Vitals and nursing note reviewed.   Constitutional:       General: He is awake. He is not in acute distress.     Appearance: Normal appearance. He is well-developed. He is not ill-appearing.   HENT:      Head: Normocephalic and atraumatic.      Nose: Nose normal. No septal deviation.      Mouth/Throat:      Lips: Pink.      Mouth: Mucous membranes are moist.   Eyes:      Conjunctiva/sclera: Conjunctivae normal.      Pupils: Pupils are equal, round, and reactive to light.   Neck:      Thyroid: No thyroid mass.      Vascular: No JVD.      Trachea: No tracheal tenderness or tracheal deviation.   Cardiovascular:      Rate and Rhythm: Normal rate and regular rhythm.      Heart sounds: S1 normal and S2 normal. Murmur heard.   Systolic murmur is present with a grade of 2/6.     No friction rub. No gallop.   Pulmonary:      Effort: Pulmonary effort is normal.      Breath sounds: Normal breath sounds. No decreased breath sounds, wheezing, rhonchi or rales.   Abdominal:      General: Abdomen is flat. Bowel sounds are normal. There is no distension.      Palpations: Abdomen is soft. There is no hepatomegaly, splenomegaly or mass.      Tenderness: There is abdominal tenderness in the epigastric area.   Musculoskeletal:      Right lower leg: No edema.      Left lower leg: No edema.   Skin:     General: Skin is warm and dry.      Capillary Refill: Capillary refill takes less than 2 seconds.      Findings: No rash.   Neurological:      General: No focal deficit present.      Mental Status: He is alert and oriented to person, place, and time. Mental status is at baseline.      GCS: GCS eye subscore is 4. GCS verbal subscore is 5. GCS motor subscore is 6.      Cranial Nerves: No cranial nerve deficit.      Sensory: Sensation is intact. No sensory deficit.      Motor: Motor function is intact.   Psychiatric:         Mood and Affect: Mood normal.         Behavior: Behavior normal. Behavior is cooperative.         CRANIAL NERVES      CN III, IV, VI   Pupils are equal, round, and reactive to light.     Significant Labs: All pertinent labs within the past 24 hours have been reviewed.  CBC:   Recent Labs   Lab 07/27/22  1627   WBC 5.59   HGB 16.7   HCT 45.4        CMP:   Recent Labs   Lab 07/27/22  1658      K 3.6      CO2 18*      BUN 10   CREATININE 0.8   CALCIUM 9.6   PROT 7.5   ALBUMIN 4.1   BILITOT 1.2*   ALKPHOS 75   AST 62*   ALT 46*   ANIONGAP 18*   EGFRNONAA >60     Lipase:   Recent Labs   Lab 07/27/22  1658   LIPASE 546*

## 2022-07-28 NOTE — PROGRESS NOTES
[facility]  Adult Nutrition  Progress Note    SUMMARY    Intervention: clear liquid diet    Recommendation:   1) Advance PO diet as pt tolerates to goal of low fat ( cardiac)   2) Add boost breeze BID for now   3) weigh weekly    Goals: 1) PO intakes > 50% of meals on full liquids in < 72 hr  Nutrition Goal Status: new  Communication of RD Recs:  (POC, sticky note)    Assessment and Plan    Inadequate energy intake  R/t altered GI function  As evidenced by PO intakes < 50% of needs x 3-4 days  Intervention: above  new       Malnutrition Assessment     Skin (Micronutrient):  (Darrius = 20)  Teeth (Micronutrient):  (WDL)   Micronutrient Evaluation: suspected deficiency  Micronutrient Evaluation Comments: check folate, B12, phos               Edema (Fluid Accumulation): 0-->no edema present   Subcutaneous Fat Loss (Final Summary): well nourished  Muscle Loss Evaluation (Final Summary): well nourished         Reason for Assessment    Reason For Assessment: consult  Diagnosis:  (alcohol induced acute pancreatitis)  Relevant Medical History: GERD, HTN, PTSD, ETOH abuse, pancreatitis  Interdisciplinary Rounds: did not attend    General Information Comments: 27 y/o male admitted with alcohol induced pancreatitis s/p 3 days abd. pain and N/V, still with abd. pain. Poor aintakes x 3-4 days, diet just advanced to clears. Pt has had pancreatitis before and declines further information about diet for pancreatitis or ETOH cessation resources, was trying to avoid greasy/fatty foods at home. NFPE WDL. Wt gain per chart review.    Nutrition Discharge Planning: To be determined- Cardiac    Nutrition Risk Screen    Nutrition Risk Screen: no indicators present    Nutrition/Diet History    Patient Reported Diet/Restrictions/Preferences: general  Spiritual, Cultural Beliefs, Islam Practices, Values that Affect Care: no  Food Allergies: NKFA  Factors Affecting Nutritional Intake: altered gastrointestinal function,  "clear liquid diet    Anthropometrics    Temp: 98.1 °F (36.7 °C)  Height Method: Measured (office 5/10/22)  Height: 5' 11"  Height (inches): 71 in  Weight Method: Bed Scale  Weight: 73.8 kg (162 lb 11.2 oz)  Weight (lb): 162.7 lb  Ideal Body Weight (IBW), Male: 172 lb  % Ideal Body Weight, Male (lb): 94.59 %  BMI (Calculated): 22.7  BMI Grade: 18.5-24.9 - normal  Usual Body Weight (UBW), k.4 kg (10/8/21)  % Usual Body Weight: 106.56  % Weight Change From Usual Weight: 6.34 %       Lab/Procedures/Meds    Pertinent Labs Reviewed: reviewed  BMP  Lab Results   Component Value Date     2022    K 3.6 2022    CL 98 2022    CO2 26 2022    BUN 6 2022    CREATININE 0.7 2022    CALCIUM 9.7 2022    ANIONGAP 15 2022    ESTGFRAFRICA >60 2022    EGFRNONAA >60 2022     Lab Results   Component Value Date    CALCIUM 9.7 2022    PHOS 2.0 (L) 2022     Lab Results   Component Value Date    LIPASE 210 (H) 2022       Pertinent Medications Reviewed: reviewed  Pertinent Medications Comments: folic acid, hydrochlorothiazide, MVI, polyethylene glycol, NS @ 125 ml/hr, zofran, Kbicarb, KNaPhos    Estimated/Assessed Needs    Weight Used For Calorie Calculations: 73.8 kg (162 lb 11.2 oz)  Energy Calorie Requirements (kcal): MSJ ( x 1.2) = 2075 kcal  [facility]  Protein Requirements: 0.8-1 g protein/kg ( 59-73 g)  Weight Used For Protein Calculations: 73.8 kg (162 lb 11.2 oz)  Fluid Requirements (mL): 2075 ml or per MD     CHO Requirement: N/A      Nutrition Prescription Ordered    Current Diet Order: NPO x 1 day    Evaluation of Received Nutrient/Fluid Intake    Energy Calories Required: not meeting needs  Protein Required: not meeting needs  Fluid Required: meeting needs  Tolerance: tolerating     Intake/Output Summary (Last 24 hours) at 2022 1457  Last data filed at 2022 1217  Gross per 24 hour   Intake 2406.25 ml   Output " 1325 ml   Net 1081.25 ml       % Intake of Estimated Energy Needs: 0% just advanced  % Meal Intake: clears    Nutrition Risk    Level of Risk/Frequency of Follow-up: moderate 2 x weekly    Monitor and Evaluation    Food and Nutrient Intake: energy intake, food and beverage intake  Food and Nutrient Adminstration: diet order  Anthropometric Measurements: weight  Biochemical Data, Medical Tests and Procedures: electrolyte and renal panel, gastrointestinal profile  Nutrition-Focused Physical Findings: overall appearance     Nutrition Follow-Up    RD Follow-up?: Yes

## 2022-07-28 NOTE — PLAN OF CARE
[facility]  Initial Discharge Assessment       Primary Care Provider: Dianne Vernon NP    Admission Diagnosis: Chest pain [R07.9]  Alcohol-induced acute pancreatitis, unspecified complication status [K85.20]    Admission Date: 7/27/2022  Expected Discharge Date:     Discharge Barriers Identified: None    Payor: MEDICAID / Plan: [place] / Product Type: Managed Medicaid /     Extended Emergency Contact Information  Primary Emergency Contact: Ayana Dena  Mobile Phone: [phone]  Relation: Significant other  Preferred language: English    Discharge Plan A: Home  Discharge Plan B: Home      [id number] - [place], [place] - [address]  [address]  [facility] [id number]  Phone: [phone] Fax: [phone]      Completed DC assessment with pt at bedside. Verified information on facesheet as correct. Denies POA. ENIO is his father, Ridge Bowman (doesn't have contact information for him). Reports living at listed address with his girlfriend and a roommate. Verified PCP and pharm. Denies hh/hd. DME- cane (uses PR[employer]). Reports being independent with activities. Does not drive- girlfriend or friend brings him to apts and one of them will provide transportation home upon DC. Verified insurance on file. DC plan is home. CM following   Initial Assessment (most recent)     Adult Discharge Assessment - 07/28/22 1249        Discharge Assessment    Assessment Type Discharge Planning Assessment     Confirmed/corrected address, phone number and insurance Yes     Confirmed Demographics Correct on Facesheet     Source of Information patient     Communicated NADINE with patient/caregiver Yes     Reason For Admission pancreatitis     Lives With friend(s);significant other     Facility Arrived From: home     Do you expect to return to your current living situation? Yes     Do you have help at home or someone to help you manage your care at home? Yes     Prior to hospitilization  cognitive status: Alert/Oriented     Current cognitive status: Alert/Oriented     Walking or Climbing Stairs Difficulty none     Dressing/Bathing Difficulty none     Equipment Currently Used at Home cane, straight     Readmission within 30 days? No     Patient currently being followed by outpatient case management? No     Do you currently have service(s) that help you manage your care at home? No     Do you take prescription medications? Yes     Do you have prescription coverage? Yes     Coverage medicaid     Do you have any problems affording any of your prescribed medications? No     Is the patient taking medications as prescribed? yes     Who is going to help you get home at discharge? Friend     How do you get to doctors appointments? family or friend will provide     Are you on dialysis? No     Do you take coumadin? No     Discharge Plan A Home     Discharge Plan B Home     DME Needed Upon Discharge  none     Discharge Plan discussed with: Patient     Discharge Barriers Identified None

## 2022-07-28 NOTE — PLAN OF CARE
Problem: Adult Inpatient Plan of Care  Goal: Plan of Care Review  Outcome: Ongoing, Progressing     Problem: Pain Acute  Goal: Acceptable Pain Control and Functional Ability  Outcome: Ongoing, Progressing     Problem: Alcohol Withdrawal  Goal: Alcohol Withdrawal Symptom Control  Outcome: Ongoing, Progressing

## 2022-07-28 NOTE — PLAN OF CARE
Intervention: clear liquid diet    Recommendation:   1) Advance PO diet as pt tolerates to goal of low fat ( cardiac)   2) Add boost breeze BID for now   3) weigh weekly    Goals: 1) PO intakes > 50% of meals on full liquids in < 72 hr  Nutrition Goal Status: new  Communication of RD Recs:  (POC, sticky note)

## 2022-07-28 NOTE — HPI
Jori Bowman is a 26 year old male with a history of alcohol induced pancreatitis (last admission approximately 4 weeks ago) GERD, HTN, and PTSD, who presented to the ED with a complaint of abdominal pain, nausea and vomiting. He states the pain and symptoms are consistent with previous episodes of pancreatitis. His last alcohol intake was about 24 hours prior to arrival. He denies a history of withdrawal, seizures, or DT's. He denies all other complaint. ED work up was remarkable for an elevated lipase level of 546. His CMP was notable for elevated liver enzymes. He received multiple doses of IV pain medication and antiemetics in the ED, which are moderately controlling his pain. Hospital Medicine consulted for admission and further management.

## 2022-07-28 NOTE — ASSESSMENT & PLAN NOTE
Chronic, uncontrolled.  Latest blood pressure and vitals reviewed-   Temp:  [98.5 °F (36.9 °C)]   Pulse:  []   Resp:  [16-20]   BP: (155-216)/()   SpO2:  [95 %-99 %] .   Home meds for hypertension were reviewed and noted below.   Hypertension Medications             cloNIDine (CATAPRES) 0.1 MG tablet Take 0.1 mg by mouth daily as needed.    metoprolol tartrate (LOPRESSOR) 25 MG tablet Take 25 mg by mouth 2 (two) times daily.    valsartan-hydrochlorothiazide (DIOVAN-HCT) 320-12.5 mg per tablet Take 1 tablet by mouth once daily.          While in the hospital, will manage blood pressure as follows; Continue home antihypertensive regimen    Will utilize p.r.n. blood pressure medication only if patient's blood pressure greater than  180/110 and he develops symptoms such as worsening chest pain or shortness of breath.

## 2022-07-28 NOTE — PLAN OF CARE
Problem: Adult Inpatient Plan of Care  Goal: Plan of Care Review  Outcome: Ongoing, Progressing     Problem: Adult Inpatient Plan of Care  Goal: Optimal Comfort and Wellbeing  Outcome: Ongoing, Progressing     Problem: Adult Inpatient Plan of Care  Goal: Patient-Specific Goal (Individualized)  Outcome: Ongoing, Progressing

## 2022-07-28 NOTE — ASSESSMENT & PLAN NOTE
Chronic, uncontrolled.  Latest blood pressure and vitals reviewed-   Temp:  [97.6 °F (36.4 °C)-99 °F (37.2 °C)]   Pulse:  []   Resp:  [16-20]   BP: (155-216)/()   SpO2:  [95 %-99 %] .   Home meds for hypertension were reviewed and noted below.   Hypertension Medications             cloNIDine (CATAPRES) 0.1 MG tablet Take 0.1 mg by mouth daily as needed.    metoprolol tartrate (LOPRESSOR) 25 MG tablet Take 25 mg by mouth 2 (two) times daily.    valsartan-hydrochlorothiazide (DIOVAN-HCT) 320-12.5 mg per tablet Take 1 tablet by mouth once daily.          While in the hospital, will manage blood pressure as follows; Continue home antihypertensive regimen    Will utilize p.r.n. blood pressure medication only if patient's blood pressure greater than  180/110 and he develops symptoms such as worsening chest pain or shortness of breath.

## 2022-07-28 NOTE — H&P
[facility] Medicine  History & Physical    Patient Name: Jori Bowman  MRN: [id number]  Patient Class: OP- Observation  Admission Date: 7/27/2022  Attending Physician: Deangelo Mayen MD  Primary Care Provider: Dianne Vernon NP         Patient information was obtained from patient, past medical records and ER records.     Subjective:     Principal Problem:Alcohol-induced acute pancreatitis    Chief Complaint:   Chief Complaint   Patient presents with    Abdominal Pain     N/V x several days, hx pancreatitis from alcohol use, last drink was approximately 24 hours ago         HPI: Jori Bowman is a 26 year old male with a history of alcohol induced pancreatitis (last admission approximately 4 weeks ago) GERD, HTN, and PTSD, who presented to the ED with a complaint of abdominal pain, nausea and vomiting. He states the pain and symptoms are consistent with previous episodes of pancreatitis. His last alcohol intake was about 24 hours prior to arrival. He denies a history of withdrawal, seizures, or DT's. He denies all other complaint. ED work up was remarkable for an elevated lipase level of 546. His CMP was notable for elevated liver enzymes. He received multiple doses of IV pain medication and antiemetics in the ED, which are moderately controlling his pain. Hospital Medicine consulted for admission and further management.      Past Medical History:   Diagnosis Date    ADHD (attention deficit hyperactivity disorder)     Alcohol-induced chronic pancreatitis     Anxiety     Asthma     Heart murmur     Hypertension     Obsessive-compulsive disorder     PTSD (post-traumatic stress disorder) 2014    Patient states that he believes he needs to be back on meds for PTSD       History reviewed. No pertinent surgical history.    Review of patient's allergies indicates:   Allergen Reactions    Abilify [aripiprazole]     Nsaids (non-steroidal anti-inflammatory drug) Diarrhea        No current facility-administered medications on file prior to encounter.     Current Outpatient Medications on File Prior to Encounter   Medication Sig    clonazePAM (KLONOPIN) 0.5 MG tablet Take 1 tablet (0.5 mg total) by mouth 2 (two) times daily as needed for Anxiety.    cloNIDine (CATAPRES) 0.1 MG tablet Take 0.1 mg by mouth daily as needed.    folic acid (FOLVITE) 1 MG tablet Take 1,000 mcg by mouth once daily.    metoprolol tartrate (LOPRESSOR) 25 MG tablet Take 25 mg by mouth 2 (two) times daily.    omeprazole (PRILOSEC) 40 MG capsule Take 40 mg by mouth once daily.    ondansetron (ZOFRAN-ODT) 8 MG TbDL Take 8 mg by mouth 3 (three) times daily as needed.    valsartan-hydrochlorothiazide (DIOVAN-HCT) 320-12.5 mg per tablet Take 1 tablet by mouth once daily.    venlafaxine (EFFEXOR-XR) 75 MG 24 hr capsule Take 1 capsule (75 mg total) by mouth once daily.    [DISCONTINUED] albuterol (ACCUNEB) 1.25 mg/3 mL Nebu Take 3 mLs (1.25 mg total) by nebulization every 6 (six) hours as needed (for wheezing). Rescue    [DISCONTINUED] albuterol (PROVENTIL HFA) 90 mcg/actuation inhaler Inhale 2 puffs into the lungs every 6 (six) hours as needed for Wheezing. Rescue    [DISCONTINUED] gabapentin (NEURONTIN) 300 MG capsule Take 300 mg by mouth nightly.    [DISCONTINUED] lisinopriL (PRINIVIL,ZESTRIL) 5 MG tablet Take 5 mg by mouth once daily.    [DISCONTINUED] MIRALAX 17 gram/dose powder As directed on prep sheet.    [DISCONTINUED] nebulizer and compressor Federica Nebulizer machine and kit. Use 4 times daily as needed for wheezing.    [DISCONTINUED] oxyCODONE-acetaminophen (PERCOCET)  mg per tablet Take 1 tablet by mouth every 4 (four) hours as needed for Pain.    [DISCONTINUED] promethazine (PHENERGAN) 12.5 MG Tab Take 25 mg by mouth 4 (four) times daily.     Family History       Problem Relation (Age of Onset)    Heart disease Mother, Father          Tobacco Use    Smoking status: Never Smoker     Smokeless tobacco: Never Used   Substance and Sexual Activity    Alcohol use: Yes     Comment: 1.7 liter of vodka over a week    Drug use: Not on file    Sexual activity: Not on file     Review of Systems   Constitutional:  Negative for chills and fever.   HENT:  Negative for congestion and sore throat.    Eyes:  Negative for visual disturbance.   Respiratory:  Negative for cough and shortness of breath.    Cardiovascular:  Negative for chest pain and palpitations.   Gastrointestinal:  Positive for abdominal pain, nausea and vomiting. Negative for constipation and diarrhea.   Endocrine: Negative for cold intolerance and heat intolerance.   Genitourinary:  Negative for dysuria and hematuria.   Musculoskeletal:  Negative for arthralgias and myalgias.   Skin:  Negative for rash.   Neurological:  Positive for tremors (mildly shaky). Negative for seizures.   Hematological:  Negative for adenopathy. Does not bruise/bleed easily.   All other systems reviewed and are negative.  Objective:     Vital Signs (Most Recent):  Temp: 98.5 °F (36.9 °C) (07/27/22 1557)  Pulse: 106 (07/27/22 1933)  Resp: 18 (07/27/22 1946)  BP: (!) 177/94 (07/27/22 1933)  SpO2: 97 % (07/27/22 1933)   Vital Signs (24h Range):  Temp:  [98.5 °F (36.9 °C)] 98.5 °F (36.9 °C)  Pulse:  [] 106  Resp:  [16-20] 18  SpO2:  [95 %-99 %] 97 %  BP: (155-216)/() 177/94     Weight: 74.4 kg (164 lb)  Body mass index is 22.87 kg/m².    Physical Exam  Vitals and nursing note reviewed.   Constitutional:       General: He is awake. He is not in acute distress.     Appearance: Normal appearance. He is well-developed. He is not ill-appearing.   HENT:      Head: Normocephalic and atraumatic.      Nose: Nose normal. No septal deviation.      Mouth/Throat:      Lips: Pink.      Mouth: Mucous membranes are moist.   Eyes:      Conjunctiva/sclera: Conjunctivae normal.      Pupils: Pupils are equal, round, and reactive to light.   Neck:      Thyroid: No thyroid mass.       Vascular: No JVD.      Trachea: No tracheal tenderness or tracheal deviation.   Cardiovascular:      Rate and Rhythm: Normal rate and regular rhythm.      Heart sounds: S1 normal and S2 normal. Murmur heard.   Systolic murmur is present with a grade of 2/6.     No friction rub. No gallop.   Pulmonary:      Effort: Pulmonary effort is normal.      Breath sounds: Normal breath sounds. No decreased breath sounds, wheezing, rhonchi or rales.   Abdominal:      General: Abdomen is flat. Bowel sounds are normal. There is no distension.      Palpations: Abdomen is soft. There is no hepatomegaly, splenomegaly or mass.      Tenderness: There is abdominal tenderness in the epigastric area.   Musculoskeletal:      Right lower leg: No edema.      Left lower leg: No edema.   Skin:     General: Skin is warm and dry.      Capillary Refill: Capillary refill takes less than 2 seconds.      Findings: No rash.   Neurological:      General: No focal deficit present.      Mental Status: He is alert and oriented to person, place, and time. Mental status is at baseline.      GCS: GCS eye subscore is 4. GCS verbal subscore is 5. GCS motor subscore is 6.      Cranial Nerves: No cranial nerve deficit.      Sensory: Sensation is intact. No sensory deficit.      Motor: Motor function is intact.   Psychiatric:         Mood and Affect: Mood normal.         Behavior: Behavior normal. Behavior is cooperative.         CRANIAL NERVES     CN III, IV, VI   Pupils are equal, round, and reactive to light.     Significant Labs: All pertinent labs within the past 24 hours have been reviewed.  CBC:   Recent Labs   Lab 07/27/22  1627   WBC 5.59   HGB 16.7   HCT 45.4        CMP:   Recent Labs   Lab 07/27/22  1658      K 3.6      CO2 18*      BUN 10   CREATININE 0.8   CALCIUM 9.6   PROT 7.5   ALBUMIN 4.1   BILITOT 1.2*   ALKPHOS 75   AST 62*   ALT 46*   ANIONGAP 18*   EGFRNONAA >60     Lipase:   Recent Labs   Lab 07/27/22  1658    LIPASE 546*           Assessment/Plan:     * Alcohol-induced acute pancreatitis  Admit to obs  NPO until no longer nauseous, then advance to clear liquids  Monitor CMP  IVF NS @125/hr  IV pain control  IV antiemetics  CIWA  diazepam      GERD (gastroesophageal reflux disease)  Noted, continue ppi        PTSD (post-traumatic stress disorder)    Noted, continue meds    Essential hypertension  Chronic, uncontrolled.  Latest blood pressure and vitals reviewed-   Temp:  [98.5 °F (36.9 °C)]   Pulse:  []   Resp:  [16-20]   BP: (155-216)/()   SpO2:  [95 %-99 %] .   Home meds for hypertension were reviewed and noted below.   Hypertension Medications             cloNIDine (CATAPRES) 0.1 MG tablet Take 0.1 mg by mouth daily as needed.    metoprolol tartrate (LOPRESSOR) 25 MG tablet Take 25 mg by mouth 2 (two) times daily.    valsartan-hydrochlorothiazide (DIOVAN-HCT) 320-12.5 mg per tablet Take 1 tablet by mouth once daily.          While in the hospital, will manage blood pressure as follows; Continue home antihypertensive regimen    Will utilize p.r.n. blood pressure medication only if patient's blood pressure greater than  180/110 and he develops symptoms such as worsening chest pain or shortness of breath.        Elevated liver enzymes  Likely secondary to pancreatitis and chronic ETOH use  Will obtain US to rule out gall stones        VTE Risk Mitigation (From admission, onward)         Ordered     IP VTE LOW RISK PATIENT  Once         07/27/22 1920     Place sequential compression device  Until discontinued         07/27/22 1920                   JOEL Cervantes  Department of Hospital Medicine   [facility] - Emergency Dept

## 2022-07-29 VITALS
HEART RATE: 86 BPM | RESPIRATION RATE: 18 BRPM | SYSTOLIC BLOOD PRESSURE: 168 MMHG | TEMPERATURE: 97 F | HEIGHT: 71 IN | WEIGHT: 162.69 LBS | DIASTOLIC BLOOD PRESSURE: 91 MMHG | BODY MASS INDEX: 22.77 KG/M2 | OXYGEN SATURATION: 96 %

## 2022-07-29 LAB
ALBUMIN SERPL BCP-MCNC: 3.8 G/DL (ref 3.5–5.2)
ALP SERPL-CCNC: 64 U/L (ref 55–135)
ALT SERPL W/O P-5'-P-CCNC: 38 U/L (ref 10–44)
ANION GAP SERPL CALC-SCNC: 9 MMOL/L (ref 8–16)
AST SERPL-CCNC: 46 U/L (ref 10–40)
BASOPHILS # BLD AUTO: 0.01 K/UL (ref 0–0.2)
BASOPHILS NFR BLD: 0.4 % (ref 0–1.9)
BILIRUB SERPL-MCNC: 0.9 MG/DL (ref 0.1–1)
BUN SERPL-MCNC: 4 MG/DL (ref 6–20)
CALCIUM SERPL-MCNC: 9.7 MG/DL (ref 8.7–10.5)
CHLORIDE SERPL-SCNC: 100 MMOL/L (ref 95–110)
CO2 SERPL-SCNC: 30 MMOL/L (ref 23–29)
CREAT SERPL-MCNC: 0.7 MG/DL (ref 0.5–1.4)
DIFFERENTIAL METHOD: ABNORMAL
EOSINOPHIL # BLD AUTO: 0.1 K/UL (ref 0–0.5)
EOSINOPHIL NFR BLD: 3.7 % (ref 0–8)
ERYTHROCYTE [DISTWIDTH] IN BLOOD BY AUTOMATED COUNT: 11.3 % (ref 11.5–14.5)
EST. GFR  (AFRICAN AMERICAN): >60 ML/MIN/1.73 M^2
EST. GFR  (NON AFRICAN AMERICAN): >60 ML/MIN/1.73 M^2
GLUCOSE SERPL-MCNC: 111 MG/DL (ref 70–110)
HCT VFR BLD AUTO: 29.3 % (ref 40–54)
HGB BLD-MCNC: 10.3 G/DL (ref 14–18)
IMM GRANULOCYTES # BLD AUTO: 0 K/UL (ref 0–0.04)
IMM GRANULOCYTES NFR BLD AUTO: 0 % (ref 0–0.5)
LIPASE SERPL-CCNC: 75 U/L (ref 4–60)
LYMPHOCYTES # BLD AUTO: 0.8 K/UL (ref 1–4.8)
LYMPHOCYTES NFR BLD: 29.7 % (ref 18–48)
MAGNESIUM SERPL-MCNC: 1.9 MG/DL (ref 1.6–2.6)
MCH RBC QN AUTO: 35.4 PG (ref 27–31)
MCHC RBC AUTO-ENTMCNC: 35.2 G/DL (ref 32–36)
MCV RBC AUTO: 101 FL (ref 82–98)
MONOCYTES # BLD AUTO: 0.4 K/UL (ref 0.3–1)
MONOCYTES NFR BLD: 13.2 % (ref 4–15)
NEUTROPHILS # BLD AUTO: 1.5 K/UL (ref 1.8–7.7)
NEUTROPHILS NFR BLD: 53 % (ref 38–73)
NRBC BLD-RTO: 0 /100 WBC
PHOSPHATE SERPL-MCNC: 2.7 MG/DL (ref 2.7–4.5)
PLATELET # BLD AUTO: 119 K/UL (ref 150–450)
PMV BLD AUTO: 11 FL (ref 9.2–12.9)
POTASSIUM SERPL-SCNC: 4.3 MMOL/L (ref 3.5–5.1)
PROT SERPL-MCNC: 7 G/DL (ref 6–8.4)
RBC # BLD AUTO: 2.91 M/UL (ref 4.6–6.2)
SODIUM SERPL-SCNC: 139 MMOL/L (ref 136–145)
WBC # BLD AUTO: 2.73 K/UL (ref 3.9–12.7)

## 2022-07-29 PROCEDURE — 36415 COLL VENOUS BLD VENIPUNCTURE: CPT | Performed by: NURSE PRACTITIONER

## 2022-07-29 PROCEDURE — 63600175 PHARM REV CODE 636 W HCPCS: Performed by: NURSE PRACTITIONER

## 2022-07-29 PROCEDURE — 25000003 PHARM REV CODE 250: Performed by: NURSE PRACTITIONER

## 2022-07-29 PROCEDURE — 83735 ASSAY OF MAGNESIUM: CPT | Performed by: NURSE PRACTITIONER

## 2022-07-29 PROCEDURE — 80053 COMPREHEN METABOLIC PANEL: CPT | Performed by: NURSE PRACTITIONER

## 2022-07-29 PROCEDURE — 83690 ASSAY OF LIPASE: CPT | Performed by: NURSE PRACTITIONER

## 2022-07-29 PROCEDURE — 85025 COMPLETE CBC W/AUTO DIFF WBC: CPT | Performed by: NURSE PRACTITIONER

## 2022-07-29 PROCEDURE — 84100 ASSAY OF PHOSPHORUS: CPT | Performed by: NURSE PRACTITIONER

## 2022-07-29 RX ORDER — ONDANSETRON 8 MG/1
8 TABLET, ORALLY DISINTEGRATING ORAL EVERY 6 HOURS PRN
Qty: 20 TABLET | Refills: 0 | OUTPATIENT
Start: 2022-07-29 | End: 2023-04-04

## 2022-07-29 RX ORDER — HYDRALAZINE HYDROCHLORIDE 20 MG/ML
10 INJECTION INTRAMUSCULAR; INTRAVENOUS EVERY 6 HOURS PRN
Status: DISCONTINUED | OUTPATIENT
Start: 2022-07-29 | End: 2022-07-29 | Stop reason: HOSPADM

## 2022-07-29 RX ORDER — OXYCODONE AND ACETAMINOPHEN 10; 325 MG/1; MG/1
1 TABLET ORAL EVERY 6 HOURS PRN
Qty: 15 TABLET | Refills: 0 | Status: SHIPPED | OUTPATIENT
Start: 2022-07-29 | End: 2023-04-04

## 2022-07-29 RX ADMIN — HYDROMORPHONE HYDROCHLORIDE 1 MG: 1 INJECTION, SOLUTION INTRAMUSCULAR; INTRAVENOUS; SUBCUTANEOUS at 12:07

## 2022-07-29 RX ADMIN — DIAZEPAM 10 MG: 5 TABLET ORAL at 09:07

## 2022-07-29 RX ADMIN — METOPROLOL TARTRATE 25 MG: 25 TABLET, FILM COATED ORAL at 10:07

## 2022-07-29 RX ADMIN — HYDROCHLOROTHIAZIDE 12.5 MG: 12.5 TABLET ORAL at 09:07

## 2022-07-29 RX ADMIN — VALSARTAN 320 MG: 80 TABLET, FILM COATED ORAL at 09:07

## 2022-07-29 RX ADMIN — OXYCODONE AND ACETAMINOPHEN 1 TABLET: 10; 325 TABLET ORAL at 02:07

## 2022-07-29 RX ADMIN — HYDRALAZINE HYDROCHLORIDE 10 MG: 20 INJECTION, SOLUTION INTRAMUSCULAR; INTRAVENOUS at 09:07

## 2022-07-29 RX ADMIN — PANTOPRAZOLE SODIUM 40 MG: 40 TABLET, DELAYED RELEASE ORAL at 09:07

## 2022-07-29 RX ADMIN — CLONIDINE HYDROCHLORIDE 0.1 MG: 0.1 TABLET ORAL at 03:07

## 2022-07-29 RX ADMIN — THERA TABS 1 TABLET: TAB at 09:07

## 2022-07-29 RX ADMIN — OXYCODONE AND ACETAMINOPHEN 1 TABLET: 10; 325 TABLET ORAL at 09:07

## 2022-07-29 RX ADMIN — FOLIC ACID 1 MG: 1 TABLET ORAL at 09:07

## 2022-07-29 RX ADMIN — HYDROMORPHONE HYDROCHLORIDE 1 MG: 1 INJECTION, SOLUTION INTRAMUSCULAR; INTRAVENOUS; SUBCUTANEOUS at 07:07

## 2022-07-29 RX ADMIN — HYDROMORPHONE HYDROCHLORIDE 1 MG: 1 INJECTION, SOLUTION INTRAMUSCULAR; INTRAVENOUS; SUBCUTANEOUS at 04:07

## 2022-07-29 NOTE — NURSING
PA notifed of pts BP and HR. Pt remains asymptomatic, manual done per order. No new orders at this time.

## 2022-07-29 NOTE — PROGRESS NOTES
07/29/22 0908   Vital Signs   BP (!) 168/94   NP made aware of patient BP and HR 86 after IV hydralazine given. NP stated to give PO lopressor and then patient is okay to discharge. Waiting for CM to clear.

## 2022-07-29 NOTE — HOSPITAL COURSE
Jori Bowman was admitted on 7/27/2022 with alcohol induced pancreatitis to hospital medicine and followed closely.Pt remained NPO x one day, then adv to clear liq then regular diet. Tolerated foods without n/v. Received IVF, IV pain medication (IV dilaudid, which he requested the exact time they were due). He was give Valium 10mg po q6 hrs to prevent ETOH withdrawal which also was requested the exact time it was due) Lipase on admission was 546 and this was trended to dc date 7/29 at 75. Other labs trended AST on adm 1.2, dc 46, T bili 1.2 on adm, 0.9 on dc. Pt remained on telemetry while in-pt. HR dropped to 40-50's during the night while sleeping with IV pain meds and benzo's on board. BP remained elevated. Metoprolol times adjusted and pt medicated with IV apresoline once. Pt was discharged in stable condition. Rx given for 3.5 day supply of pain medication (Percocet 10), Zofran 8mg for nausea. [facility] Pt reported he was stopping Effexor and was encouraged to not stop cold turkey or he would experience side effects and to speak with prescribing provider about this. Encouraged to see PCP within 4 days after discharge and to stop ETOH consumption due to re-occurring episodes of pancreatitis and early elevation of liver enzymes.

## 2022-07-29 NOTE — PLAN OF CARE
Problem: Adult Inpatient Plan of Care  Goal: Optimal Comfort and Wellbeing  Outcome: Ongoing, Progressing     Problem: Pain Acute  Goal: Acceptable Pain Control and Functional Ability  Outcome: Ongoing, Progressing     Problem: Alcohol Withdrawal  Goal: Alcohol Withdrawal Symptom Control  Outcome: Ongoing, Progressing

## 2022-07-29 NOTE — PLAN OF CARE
Reviewed discharge instructions with patient. Patient verbalizes understanding. Ambulated with patient to room 420 where his significant other is a patient and ride will be coming to get both of them. NAD noted.

## 2022-07-29 NOTE — PLAN OF CARE
Pt clear for DC from case management standpoint. Discharging to home       ETOH abuse resources attached to AVS     07/29/22 1119   Final Note   Assessment Type Final Discharge Note   Anticipated Discharge Disposition Home   Hospital Resources/Appts/Education Provided Appointments scheduled and added to AVS;Community resources provided

## 2022-07-29 NOTE — DISCHARGE SUMMARY
[facility] [place] Medicine  Discharge Summary      Patient Name: Jori Bowman  MRN: [id number]  Patient Class: IP- Inpatient  Admission Date: 7/27/2022  Hospital Length of Stay: 1 days  Discharge Date and Time:  07/29/2022 2:13 PM  Attending Physician: No att. providers found   Discharging Provider: Sara Goel NP  Primary Care Provider: Dianne Vernon NP      HPI:   Jori Bowman is a 26 year old male with a history of alcohol induced pancreatitis (last admission approximately 4 weeks ago) GERD, HTN, and PTSD, who presented to the ED with a complaint of abdominal pain, nausea and vomiting. He states the pain and symptoms are consistent with previous episodes of pancreatitis. His last alcohol intake was about 24 hours prior to arrival. He denies a history of withdrawal, seizures, or DT's. He denies all other complaint. ED work up was remarkable for an elevated lipase level of 546. His CMP was notable for elevated liver enzymes. He received multiple doses of IV pain medication and antiemetics in the ED, which are moderately controlling his pain. Hospital Medicine consulted for admission and further management.      * No surgery found *      Hospital Course:   Jori Bowman was admitted on 7/27/2022 with alcohol induced pancreatitis to hospital medicine and followed closely.Pt remained NPO x one day, then adv to clear liq then regular diet. Tolerated foods without n/v. Received IVF, IV pain medication (IV dilaudid, which he requested the exact time they were due). He was give Valium 10mg po q6 hrs to prevent ETOH withdrawal which also was requested the exact time it was due) Lipase on admission was 546 and this was trended to dc date 7/29 at 75. Other labs trended AST on adm 1.2, dc 46, T bili 1.2 on adm, 0.9 on dc. Pt remained on telemetry while in-pt. HR dropped to 40-50's during the night while sleeping with IV pain meds and benzo's on board. BP remained elevated.  Metoprolol times adjusted and pt medicated with IV apresoline once. Pt was discharged in stable condition. Rx given for 3.5 day supply of pain medication (Percocet 10), Zofran 8mg for nausea. [facility] Pt reported he was stopping Effexor and was encouraged to not stop cold turkey or he would experience side effects and to speak with prescribing provider about this. Encouraged to see PCP within 4 days after discharge and to stop ETOH consumption due to re-occurring episodes of pancreatitis and early elevation of liver enzymes.        Goals of Care Treatment Preferences:  Code Status: Full Code      Consults:     * Alcohol-induced acute pancreatitis  Admit to obs  NPO until no longer nauseous, then advance to clear liquids  Monitor CMP  IVF NS @125/hr  IV pain control  IV antiemetics  CIWA  diazepam      GERD (gastroesophageal reflux disease)  Noted, continue ppi        PTSD (post-traumatic stress disorder)    Noted, continue meds    Essential hypertension  Chronic, uncontrolled.  Latest blood pressure and vitals reviewed-   Temp:  [97.6 °F (36.4 °C)-99 °F (37.2 °C)]   Pulse:  []   Resp:  [16-20]   BP: (155-216)/()   SpO2:  [95 %-99 %] .   Home meds for hypertension were reviewed and noted below.   Hypertension Medications             cloNIDine (CATAPRES) 0.1 MG tablet Take 0.1 mg by mouth daily as needed.    metoprolol tartrate (LOPRESSOR) 25 MG tablet Take 25 mg by mouth 2 (two) times daily.    valsartan-hydrochlorothiazide (DIOVAN-HCT) 320-12.5 mg per tablet Take 1 tablet by mouth once daily.          While in the hospital, will manage blood pressure as follows; Continue home antihypertensive regimen    Will utilize p.r.n. blood pressure medication only if patient's blood pressure greater than  180/110 and he develops symptoms such as worsening chest pain or shortness of breath.        Elevated liver enzymes  Likely secondary to pancreatitis and chronic  ETOH use  Will obtain US to rule out gall stones        Final Active Diagnoses:    Diagnosis Date Noted POA    PRINCIPAL PROBLEM:  Alcohol-induced acute pancreatitis [K85.20] 07/27/2022 Yes    Elevated liver enzymes [R74.8] 07/27/2022 Yes    Essential hypertension [I10] 07/27/2022 Yes    PTSD (post-traumatic stress disorder) [F43.10] 07/27/2022 Yes    GERD (gastroesophageal reflux disease) [K21.9] 07/27/2022 Yes      Problems Resolved During this Admission:       Discharged Condition: stable    Disposition: Home or Self Care    Follow Up:   Follow-up Information     Dianne Vernon NP Follow up on 8/1/2022.    Specialty: Family Medicine  Why: 11 AM for hospital follow up  Contact information:  [address] [address] [facility]  [place] [address] [phone]                       Patient Instructions:      Diet Cardiac     Activity as tolerated       Significant Diagnostic Studies: Labs:   CMP   Recent Labs   Lab 07/27/22  1658 07/28/22  0500 07/29/22  0652    139 139   K 3.6 3.6 4.3    98 100   CO2 18* 26 30*    77 111*   BUN 10 6 4*   CREATININE 0.8 0.7 0.7   CALCIUM 9.6 9.7 9.7   PROT 7.5 7.4 7.0   ALBUMIN 4.1 4.0 3.8   BILITOT 1.2* 1.2* 0.9   ALKPHOS 75 68 64   AST 62* 47* 46*   ALT 46* 42 38   ANIONGAP 18* 15 9   ESTGFRAFRICA >60 >60 >60   EGFRNONAA >60 >60 >60       Pending Diagnostic Studies:     None         Medications:  Reconciled Home Medications:      Medication List      CHANGE how you take these medications    ondansetron 8 MG Tbdl  Commonly known as: ZOFRAN-ODT  Take 1 tablet (8 mg total) by mouth every 6 (six) hours as needed (nausea or vomiting).  What changed:   · when to take this  · reasons to take this     oxyCODONE-acetaminophen  mg per tablet  Commonly known as: PERCOCET  Take 1 tablet by mouth every 6 (six) hours as needed for Pain.  What changed: when to take this        CONTINUE taking these medications    clonazePAM 0.5 MG tablet  Commonly known as:  KlonoPIN  Take 1 tablet (0.5 mg total) by mouth 2 (two) times daily as needed for Anxiety.     cloNIDine 0.1 MG tablet  Commonly known as: CATAPRES  Take 0.1 mg by mouth daily as needed.     folic acid 1 MG tablet  Commonly known as: FOLVITE  Take 1,000 mcg by mouth once daily.     metoprolol tartrate 25 MG tablet  Commonly known as: LOPRESSOR  Take 25 mg by mouth 2 (two) times daily.     omeprazole 40 MG capsule  Commonly known as: PRILOSEC  Take 40 mg by mouth once daily.     valsartan-hydrochlorothiazide 320-12.5 mg per tablet  Commonly known as: DIOVAN-HCT  Take 1 tablet by mouth once daily.     venlafaxine 75 MG 24 hr capsule  Commonly known as: EFFEXOR-XR  Take 1 capsule (75 mg total) by mouth once daily.        STOP taking these medications    albuterol 1.25 mg/3 mL Nebu  Commonly known as: ACCUNEB     albuterol 90 mcg/actuation inhaler  Commonly known as: PROVENTIL HFA     gabapentin 300 MG capsule  Commonly known as: NEURONTIN     lisinopriL 5 MG tablet  Commonly known as: PRINIVIL,ZESTRIL     MIRALAX 17 gram/dose powder  Generic drug: polyethylene glycol     nebulizer and compressor Federica     promethazine 12.5 MG Tab  Commonly known as: PHENERGAN            Indwelling Lines/Drains at time of discharge:   Lines/Drains/Airways     None                 Time spent on the discharge of patient: 45 minutes         Sara Goel NP  Department of Hospital Medicine  [facility]

## 2022-08-01 ENCOUNTER — TELEPHONE (OUTPATIENT)
Dept: MEDSURG UNIT | Facility: HOSPITAL | Age: 27
End: 2022-08-01
Payer: MEDICAID

## 2024-09-11 ENCOUNTER — ON-DEMAND VIRTUAL (OUTPATIENT)
Dept: URGENT CARE | Facility: CLINIC | Age: 29
End: 2024-09-11
Payer: MEDICAID

## 2024-09-11 DIAGNOSIS — R10.9 ABDOMINAL PAIN, ACUTE: Primary | ICD-10-CM

## 2024-09-11 PROCEDURE — 99212 OFFICE O/P EST SF 10 MIN: CPT | Mod: 95,,, | Performed by: NURSE PRACTITIONER

## 2024-09-11 NOTE — PROGRESS NOTES
Subjective:      Patient ID: Jori Bowman is a 29 y.o. male.    Vitals:  vitals were not taken for this visit.     Chief Complaint: Abdominal Pain      Visit Type: TELE AUDIOVISUAL    Present with the patient at the time of consultation: TELEMED PRESENT WITH PATIENT: None    Past Medical History:   Diagnosis Date    ADHD (attention deficit hyperactivity disorder)     Alcohol-induced chronic pancreatitis     Anxiety     Asthma     Cancer     Heart murmur     Hypertension     Leg injury     right leg has shrapnel    Obsessive-compulsive disorder     PTSD (post-traumatic stress disorder)     Patient states that he believes he needs to be back on meds for PTSD    Seizures      Past Surgical History:   Procedure Laterality Date    COLONOSCOPY N/A 2023    Procedure: COLONOSCOPY;  Surgeon: Waldo Pérez MD;  Location: [facility];  Service: Endoscopy;  Laterality: N/A;     Review of patient's allergies indicates:   Allergen Reactions    Aripiprazole Other (See Comments)    Nsaids (non-steroidal anti-inflammatory drug) Diarrhea and Other (See Comments)     Current Outpatient Medications on File Prior to Visit   Medication Sig Dispense Refill    albuterol (PROVENTIL/VENTOLIN HFA) 90 mcg/actuation inhaler SMARTSI Puff(s) Via Inhaler Every 6 Hours PRN      cariprazine (VRAYLAR) 1.5 mg Cap Take 1.5 mg by mouth once daily.      clonazePAM (KLONOPIN) 0.5 MG tablet Take 1 tablet (0.5 mg total) by mouth 2 (two) times daily as needed for Anxiety. 10 tablet 0    cloNIDine (CATAPRES) 0.1 MG tablet Take 0.1 mg by mouth daily as needed.      dextroamphetamine-amphetamine 10 mg Tab Take 10 mg by mouth once daily.      dicyclomine (BENTYL) 20 mg tablet Take 1 tablet (20 mg total) by mouth every 6 (six) hours as needed (pain and cramping). 20 tablet 0    gabapentin (NEURONTIN) 600 MG tablet Take 600 mg by mouth 2 (two) times daily.      HYDROmorphone (DILAUDID) 2 MG tablet Take 0.5 tablets (1 mg total) by mouth every 4  (four) hours as needed for Pain. 18 tablet 0    LIDOcaine (LIDODERM) 5 % Place 1 patch onto the skin once daily.      metoprolol tartrate (LOPRESSOR) 25 MG tablet Take 25 mg by mouth 2 (two) times daily.      montelukast (SINGULAIR) 10 mg tablet Take 10 mg by mouth once daily.      omeprazole (PRILOSEC) 40 MG capsule Take 40 mg by mouth once daily.      ondansetron (ZOFRAN-ODT) 4 MG TbDL Take 1 tablet (4 mg total) by mouth every 8 (eight) hours as needed (Nausea/vomiting). 12 tablet 0    pantoprazole (PROTONIX) 40 MG tablet Take 1 tablet (40 mg total) by mouth once daily. 90 tablet 0    promethazine (PHENERGAN) 25 MG tablet Take 1 tablet (25 mg total) by mouth every 6 (six) hours as needed for Nausea. 15 tablet 0    traMADoL (ULTRAM) 50 mg tablet Take 1 tablet (50 mg total) by mouth every 6 (six) hours. 6 tablet 0    valsartan-hydrochlorothiazide (DIOVAN-HCT) 320-12.5 mg per tablet Take 1 tablet by mouth once daily.      zolpidem (AMBIEN) 5 MG Tab Take 5 mg by mouth every evening.       No current facility-administered medications on file prior to visit.     Family History   Problem Relation Name Age of Onset    Heart disease Mother      Heart disease Father             Ohs Peq Odvv Intake    9/11/2024  3:58 PM CDT - Filed by Patient   What is your current physical address in the event of a medical emergency? [address], [place], [address]   Are you able to take your vital signs? No   Please attach any relevant images or files          Patient states visiting from [place]. C/o abdominal pain associated w hx of Crohn's, recently treated in ER, pain is greatly improved but unable to continue hydromorphone d/t stomach upset. Tx w dicyclomine and zofran.     No report of fever, chills, vomiting, blood in stool         Constitution: Negative for appetite change, chills, fatigue, fever and unexpected weight change.   Neck: Negative for painful lymph nodes.   Cardiovascular:  Negative for chest pain,  palpitations and sob on exertion.   Respiratory:  Negative for chest tightness, cough and shortness of breath.    Gastrointestinal:  Positive for abdominal pain and nausea. Negative for history of abdominal surgery, vomiting, constipation, diarrhea, bright red blood in stool, dark colored stools, rectal bleeding, rectal pain and hemorrhoids.   Genitourinary:  Negative for dysuria, frequency, urgency, flank pain and pelvic pain.   Musculoskeletal:  Negative for back pain.   Skin:  Negative for color change, pale and rash.   Neurological:  Negative for coordination disturbances.   Hematologic/Lymphatic: Negative for swollen lymph nodes.        Objective:   The physical exam was conducted virtually.  Physical Exam   Constitutional: He is oriented to person, place, and time. No distress.   HENT:   Head: Normocephalic and atraumatic.   Mouth/Throat: Oropharynx is clear and moist and mucous membranes are normal.   Eyes: Conjunctivae are normal. No scleral icterus.   Pulmonary/Chest: Effort normal. No respiratory distress.   Abdominal: He exhibits no distension. There is no guarding.   Musculoskeletal: Normal range of motion.         General: Normal range of motion.   Neurological: He is alert and oriented to person, place, and time.   Skin: Skin is not diaphoretic.   Psychiatric: His behavior is normal. Judgment and thought content normal.   Vitals reviewed.      Assessment:     1. Abdominal pain, acute        Plan:       Abdominal pain, acute                  Patient Instructions   - You must understand that you have received an Urgent Care treatment only and that you may be released before all of your medical problems are known or treated.   - You, the patient, will arrange for follow up care as instructed.   - If your condition worsens or fails to improve we recommend that you receive another evaluation at the ER immediately or contact your PCP to discuss your concerns or return here.     Advised on return/follow-up  precautions. Advised on ER precautions. Answered all patient questions. Patient verbalized understanding and voiced agreement with current treatment plan.

## 2024-09-17 ENCOUNTER — ON-DEMAND VIRTUAL (OUTPATIENT)
Dept: URGENT CARE | Facility: CLINIC | Age: 29
End: 2024-09-17
Payer: MEDICAID

## 2024-09-17 DIAGNOSIS — K04.7 DENTAL ABSCESS: ICD-10-CM

## 2024-09-17 DIAGNOSIS — Z71.6 ENCOUNTER FOR SMOKING CESSATION COUNSELING: Primary | ICD-10-CM

## 2024-09-17 PROCEDURE — 99212 OFFICE O/P EST SF 10 MIN: CPT | Mod: 95,,, | Performed by: NURSE PRACTITIONER

## 2024-09-17 RX ORDER — IBUPROFEN 200 MG
1 TABLET ORAL DAILY
Qty: 14 PATCH | Refills: 0 | Status: SHIPPED | OUTPATIENT
Start: 2024-09-17 | End: 2024-10-01

## 2024-09-17 NOTE — PROGRESS NOTES
Subjective:      Patient ID: Jori Bowman is a 29 y.o. male.    Vitals:  vitals were not taken for this visit.     Chief Complaint: Dental Problem and Nicotine Dependence      Visit Type: TELE AUDIOVISUAL    Present with the patient at the time of consultation: TELEMED PRESENT WITH PATIENT: None, at home    Past Medical History:   Diagnosis Date    ADHD (attention deficit hyperactivity disorder)     Alcohol-induced chronic pancreatitis     Anxiety     Asthma     Cancer     Heart murmur     Hypertension     Leg injury     right leg has shrapnel    Obsessive-compulsive disorder     PTSD (post-traumatic stress disorder)     Patient states that he believes he needs to be back on meds for PTSD    Seizures      Past Surgical History:   Procedure Laterality Date    COLONOSCOPY N/A 2023    Procedure: COLONOSCOPY;  Surgeon: Waldo Pérez MD;  Location: [facility];  Service: Endoscopy;  Laterality: N/A;     Review of patient's allergies indicates:   Allergen Reactions    Aripiprazole Other (See Comments)    Nsaids (non-steroidal anti-inflammatory drug) Diarrhea and Other (See Comments)     Current Outpatient Medications on File Prior to Visit   Medication Sig Dispense Refill    albuterol (PROVENTIL/VENTOLIN HFA) 90 mcg/actuation inhaler SMARTSI Puff(s) Via Inhaler Every 6 Hours PRN      amoxicillin-clavulanate 875-125mg (AUGMENTIN) 875-125 mg per tablet Take 1 tablet by mouth 2 (two) times daily. 14 tablet 0    cariprazine (VRAYLAR) 1.5 mg Cap Take 1.5 mg by mouth once daily.      clonazePAM (KLONOPIN) 0.5 MG tablet Take 1 tablet (0.5 mg total) by mouth 2 (two) times daily as needed for Anxiety. 10 tablet 0    cloNIDine (CATAPRES) 0.1 MG tablet Take 0.1 mg by mouth daily as needed.      gabapentin (NEURONTIN) 600 MG tablet Take 600 mg by mouth 2 (two) times daily.      HYDROmorphone (DILAUDID) 2 MG tablet Take 0.5 tablets (1 mg total) by mouth every 4 (four) hours as needed for Pain. 18 tablet 0     LIDOcaine (LIDODERM) 5 % Place 1 patch onto the skin once daily.      metoprolol tartrate (LOPRESSOR) 25 MG tablet Take 25 mg by mouth 2 (two) times daily.      montelukast (SINGULAIR) 10 mg tablet Take 10 mg by mouth once daily.      omeprazole (PRILOSEC) 40 MG capsule Take 40 mg by mouth once daily.      ondansetron (ZOFRAN-ODT) 4 MG TbDL Take 1 tablet (4 mg total) by mouth every 8 (eight) hours as needed (Nausea/vomiting). 12 tablet 0    pantoprazole (PROTONIX) 40 MG tablet Take 1 tablet (40 mg total) by mouth once daily. 90 tablet 0    promethazine (PHENERGAN) 25 MG tablet Take 1 tablet (25 mg total) by mouth every 6 (six) hours as needed for Nausea. 15 tablet 0    traMADoL (ULTRAM) 50 mg tablet Take 1 tablet (50 mg total) by mouth every 6 (six) hours. 6 tablet 0    valsartan-hydrochlorothiazide (DIOVAN-HCT) 320-12.5 mg per tablet Take 1 tablet by mouth once daily.      [DISCONTINUED] dextroamphetamine-amphetamine 10 mg Tab Take 10 mg by mouth once daily.      [DISCONTINUED] zolpidem (AMBIEN) 5 MG Tab Take 5 mg by mouth every evening.       No current facility-administered medications on file prior to visit.     Family History   Problem Relation Name Age of Onset    Heart disease Mother      Heart disease Father         Medications Ordered                [id number] - [place], [place] - [address] AT [address] [address]    Telephone: [phone]   Fax: [phone]   Hours: Not open 24 hours                         E-Prescribed (1 of 1)              nicotine (NICODERM CQ) 21 mg/24 hr    Sig: Place 1 patch onto the skin once daily. for 14 days       Start: 9/17/24     Quantity: 14 patch Refills: 0                           Ohs Peq Odvv Intake    9/17/2024 11:39 AM CDT - Filed by Patient   What is your current physical address in the event of a medical emergency? [address], [place], [address]   Are you able to take your vital signs?  No   Please attach any relevant images or files          Currently on Augmentin for a tooth fracture and abscess. Worsening symptoms. Has peridex to use, but has not used yet. Ulcer now to the tongue. No airway compromise. Has follow-up on Friday with Dentist. Seeking further treatment advice.  Also seeking smoking cessation information and treatment. Currently vapes.    Nicotine Dependence  Symptoms are negative for sore throat. His urge triggers include company of smokers.       Constitution: Negative for chills and fever.   HENT:  Positive for dental problem, mouth sores and tongue lesion. Negative for hearing loss, drooling, tongue pain, facial swelling, facial trauma, congestion, sore throat, trouble swallowing and voice change.    Respiratory:  Negative for shortness of breath, stridor and wheezing.         Objective:   The physical exam was conducted virtually.  Physical Exam   Constitutional: He is oriented to person, place, and time. He does not appear ill. No distress.   HENT:   Head: Normocephalic and atraumatic.   Nose: Nose normal.   Mouth/Throat: Oral lesions present. Abnormal dentition. Dental abscesses present.   Eyes: Extraocular movement intact   Pulmonary/Chest: Effort normal.   Abdominal: Normal appearance.   Musculoskeletal: Normal range of motion.         General: Normal range of motion.   Neurological: no focal deficit. He is alert and oriented to person, place, and time.   Psychiatric: His behavior is normal. Mood normal.   Vitals reviewed.      Assessment:     1. Encounter for smoking cessation counseling    2. Dental abscess        Plan:   Continue meds as prescribed. Follow-up as scheduled or back to ER as discussed.    Contact [facility] for smoking cessation program.    Patient encouraged to monitor symptoms closely and instructed to follow-up for new or worsening symptoms. Further, in-person, evaluation may be necessary for continued treatment. Please follow up with your primary care  doctor or specialist as needed. Verbally discussed plan. Patient confirms understanding and is in agreement with treatment and plan.     You must understand that you've received a Virtual Care evaluation only and that you may be released before all your medical problems are known or treated. You, the patient, will arrange for follow up care as instructed.      Encounter for smoking cessation counseling  -     nicotine (NICODERM CQ) 21 mg/24 hr; Place 1 patch onto the skin once daily. for 14 days  Dispense: 14 patch; Refill: 0    Dental abscess

## 2024-11-29 ENCOUNTER — HOSPITAL ENCOUNTER (EMERGENCY)
Facility: OTHER | Age: 29
Discharge: HOME | End: 2024-11-29
Attending: EMERGENCY MEDICINE
Payer: MEDICAID

## 2024-11-29 VITALS
RESPIRATION RATE: 19 BRPM | SYSTOLIC BLOOD PRESSURE: 130 MMHG | HEART RATE: 91 BPM | TEMPERATURE: 98 F | DIASTOLIC BLOOD PRESSURE: 73 MMHG | OXYGEN SATURATION: 97 %

## 2024-11-29 DIAGNOSIS — S02.30XS: Primary | ICD-10-CM

## 2024-11-29 PROCEDURE — 99283 EMERGENCY DEPT VISIT LOW MDM: CPT

## 2024-11-29 RX ORDER — OXYCODONE HYDROCHLORIDE 10 MG/1
10 TABLET ORAL EVERY 8 HOURS PRN
Qty: 9 TABLET | Refills: 0 | Status: SHIPPED | OUTPATIENT
Start: 2024-11-29
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
